# Patient Record
Sex: MALE | Race: WHITE | NOT HISPANIC OR LATINO | Employment: FULL TIME | URBAN - METROPOLITAN AREA
[De-identification: names, ages, dates, MRNs, and addresses within clinical notes are randomized per-mention and may not be internally consistent; named-entity substitution may affect disease eponyms.]

---

## 2022-01-06 ENCOUNTER — LAB VISIT (OUTPATIENT)
Dept: LAB | Facility: HOSPITAL | Age: 32
End: 2022-01-06
Attending: INTERNAL MEDICINE
Payer: COMMERCIAL

## 2022-01-06 ENCOUNTER — OFFICE VISIT (OUTPATIENT)
Dept: INTERNAL MEDICINE | Facility: CLINIC | Age: 32
End: 2022-01-06
Payer: COMMERCIAL

## 2022-01-06 VITALS
OXYGEN SATURATION: 96 % | DIASTOLIC BLOOD PRESSURE: 98 MMHG | HEIGHT: 75 IN | SYSTOLIC BLOOD PRESSURE: 132 MMHG | HEART RATE: 65 BPM | TEMPERATURE: 98 F | BODY MASS INDEX: 38.57 KG/M2 | WEIGHT: 310.19 LBS

## 2022-01-06 DIAGNOSIS — E66.9 CLASS 2 OBESITY WITH BODY MASS INDEX (BMI) OF 38.0 TO 38.9 IN ADULT, UNSPECIFIED OBESITY TYPE, UNSPECIFIED WHETHER SERIOUS COMORBIDITY PRESENT: ICD-10-CM

## 2022-01-06 DIAGNOSIS — R68.82 DECREASED LIBIDO: ICD-10-CM

## 2022-01-06 DIAGNOSIS — Z00.00 ROUTINE GENERAL MEDICAL EXAMINATION AT A HEALTH CARE FACILITY: ICD-10-CM

## 2022-01-06 DIAGNOSIS — Z00.00 ROUTINE GENERAL MEDICAL EXAMINATION AT A HEALTH CARE FACILITY: Primary | ICD-10-CM

## 2022-01-06 LAB
BASOPHILS # BLD AUTO: 0.08 K/UL (ref 0–0.2)
BASOPHILS NFR BLD: 0.9 % (ref 0–1.9)
CHOLEST SERPL-MCNC: 171 MG/DL (ref 120–199)
CHOLEST/HDLC SERPL: 4.8 {RATIO} (ref 2–5)
DIFFERENTIAL METHOD: ABNORMAL
EOSINOPHIL # BLD AUTO: 0.2 K/UL (ref 0–0.5)
EOSINOPHIL NFR BLD: 2.7 % (ref 0–8)
ERYTHROCYTE [DISTWIDTH] IN BLOOD BY AUTOMATED COUNT: 13.3 % (ref 11.5–14.5)
HCT VFR BLD AUTO: 44.1 % (ref 40–54)
HDLC SERPL-MCNC: 36 MG/DL (ref 40–75)
HDLC SERPL: 21.1 % (ref 20–50)
HGB BLD-MCNC: 14 G/DL (ref 14–18)
IMM GRANULOCYTES # BLD AUTO: 0.06 K/UL (ref 0–0.04)
IMM GRANULOCYTES NFR BLD AUTO: 0.7 % (ref 0–0.5)
LDLC SERPL CALC-MCNC: 101.8 MG/DL (ref 63–159)
LYMPHOCYTES # BLD AUTO: 2.3 K/UL (ref 1–4.8)
LYMPHOCYTES NFR BLD: 25.3 % (ref 18–48)
MCH RBC QN AUTO: 26.7 PG (ref 27–31)
MCHC RBC AUTO-ENTMCNC: 31.7 G/DL (ref 32–36)
MCV RBC AUTO: 84 FL (ref 82–98)
MONOCYTES # BLD AUTO: 0.7 K/UL (ref 0.3–1)
MONOCYTES NFR BLD: 7.6 % (ref 4–15)
NEUTROPHILS # BLD AUTO: 5.6 K/UL (ref 1.8–7.7)
NEUTROPHILS NFR BLD: 62.8 % (ref 38–73)
NONHDLC SERPL-MCNC: 135 MG/DL
NRBC BLD-RTO: 0 /100 WBC
PLATELET # BLD AUTO: 265 K/UL (ref 150–450)
PMV BLD AUTO: 12.6 FL (ref 9.2–12.9)
RBC # BLD AUTO: 5.25 M/UL (ref 4.6–6.2)
TRIGL SERPL-MCNC: 166 MG/DL (ref 30–150)
TSH SERPL DL<=0.005 MIU/L-ACNC: 1.24 UIU/ML (ref 0.4–4)
WBC # BLD AUTO: 8.9 K/UL (ref 3.9–12.7)

## 2022-01-06 PROCEDURE — 84443 ASSAY THYROID STIM HORMONE: CPT | Performed by: INTERNAL MEDICINE

## 2022-01-06 PROCEDURE — 3080F PR MOST RECENT DIASTOLIC BLOOD PRESSURE >= 90 MM HG: ICD-10-PCS | Mod: CPTII,S$GLB,, | Performed by: INTERNAL MEDICINE

## 2022-01-06 PROCEDURE — 87389 HIV-1 AG W/HIV-1&-2 AB AG IA: CPT | Performed by: INTERNAL MEDICINE

## 2022-01-06 PROCEDURE — 1159F PR MEDICATION LIST DOCUMENTED IN MEDICAL RECORD: ICD-10-PCS | Mod: CPTII,S$GLB,, | Performed by: INTERNAL MEDICINE

## 2022-01-06 PROCEDURE — 80061 LIPID PANEL: CPT | Performed by: INTERNAL MEDICINE

## 2022-01-06 PROCEDURE — 99385 PREV VISIT NEW AGE 18-39: CPT | Mod: S$GLB,,, | Performed by: INTERNAL MEDICINE

## 2022-01-06 PROCEDURE — 3080F DIAST BP >= 90 MM HG: CPT | Mod: CPTII,S$GLB,, | Performed by: INTERNAL MEDICINE

## 2022-01-06 PROCEDURE — 3075F PR MOST RECENT SYSTOLIC BLOOD PRESS GE 130-139MM HG: ICD-10-PCS | Mod: CPTII,S$GLB,, | Performed by: INTERNAL MEDICINE

## 2022-01-06 PROCEDURE — 82040 ASSAY OF SERUM ALBUMIN: CPT | Performed by: INTERNAL MEDICINE

## 2022-01-06 PROCEDURE — 3075F SYST BP GE 130 - 139MM HG: CPT | Mod: CPTII,S$GLB,, | Performed by: INTERNAL MEDICINE

## 2022-01-06 PROCEDURE — 3008F BODY MASS INDEX DOCD: CPT | Mod: CPTII,S$GLB,, | Performed by: INTERNAL MEDICINE

## 2022-01-06 PROCEDURE — 3008F PR BODY MASS INDEX (BMI) DOCUMENTED: ICD-10-PCS | Mod: CPTII,S$GLB,, | Performed by: INTERNAL MEDICINE

## 2022-01-06 PROCEDURE — 86803 HEPATITIS C AB TEST: CPT | Performed by: INTERNAL MEDICINE

## 2022-01-06 PROCEDURE — 99999 PR PBB SHADOW E&M-NEW PATIENT-LVL IV: ICD-10-PCS | Mod: PBBFAC,,, | Performed by: INTERNAL MEDICINE

## 2022-01-06 PROCEDURE — 85025 COMPLETE CBC W/AUTO DIFF WBC: CPT | Performed by: INTERNAL MEDICINE

## 2022-01-06 PROCEDURE — 99385 PR PREVENTIVE VISIT,NEW,18-39: ICD-10-PCS | Mod: S$GLB,,, | Performed by: INTERNAL MEDICINE

## 2022-01-06 PROCEDURE — 1159F MED LIST DOCD IN RCRD: CPT | Mod: CPTII,S$GLB,, | Performed by: INTERNAL MEDICINE

## 2022-01-06 PROCEDURE — 36415 COLL VENOUS BLD VENIPUNCTURE: CPT | Performed by: INTERNAL MEDICINE

## 2022-01-06 PROCEDURE — 99999 PR PBB SHADOW E&M-NEW PATIENT-LVL IV: CPT | Mod: PBBFAC,,, | Performed by: INTERNAL MEDICINE

## 2022-01-06 RX ORDER — ONDANSETRON 4 MG/1
TABLET, ORALLY DISINTEGRATING ORAL
COMMUNITY
Start: 2021-06-28 | End: 2022-01-06

## 2022-01-06 NOTE — PROGRESS NOTES
"Subjective:      Patient ID: Enrike Parish is a 31 y.o. male.    Chief Complaint: Establish Care    HPI     30 yo with There is no problem list on file for this patient.    Here today for annual prev exam.  Compliant with meds without significant side effects. Energy and appetite are good.     Feels decreased sex drive.     Vapes. No cigs    Declines flu vaccine and covid vaccine.     Social History     Socioeconomic History    Marital status:    Tobacco Use    Smoking status: Current Every Day Smoker     Types: Vaping with nicotine    Smokeless tobacco: Never Used   Substance and Sexual Activity    Alcohol use: Not Currently    Drug use: Not Currently    Sexual activity: Yes     Partners: Female       family history includes Diabetes in his mother; Multiple sclerosis in his mother; Stroke in his father.  No f/h of prostate or colon cancer.     Review of Systems   Constitutional: Negative for chills and fever.   HENT: Negative for ear pain and sore throat.    Respiratory: Negative for cough.    Cardiovascular: Negative for chest pain.   Gastrointestinal: Negative for abdominal pain and blood in stool.   Genitourinary: Negative for dysuria and hematuria.   Neurological: Negative for seizures and syncope.     Objective:   BP (!) 132/98 (BP Location: Right arm, Patient Position: Sitting, BP Method: Large (Manual))   Pulse 65   Temp 97.6 °F (36.4 °C) (Tympanic)   Ht 6' 3" (1.905 m)   Wt (!) 140.7 kg (310 lb 3 oz)   SpO2 96%   BMI 38.77 kg/m²     Physical Exam  Constitutional:       General: He is not in acute distress.     Appearance: He is well-developed and well-nourished.   HENT:      Head: Normocephalic and atraumatic.      Mouth/Throat:      Mouth: Oropharynx is clear and moist.   Eyes:      Extraocular Movements: EOM normal.      Pupils: Pupils are equal, round, and reactive to light.   Neck:      Thyroid: No thyromegaly.   Cardiovascular:      Rate and Rhythm: Normal rate and regular rhythm. "   Pulmonary:      Breath sounds: Normal breath sounds. No wheezing or rales.   Abdominal:      General: Bowel sounds are normal.      Palpations: Abdomen is soft.      Tenderness: There is no abdominal tenderness.   Musculoskeletal:      Cervical back: Neck supple.   Lymphadenopathy:      Cervical: No cervical adenopathy.   Skin:     General: Skin is warm and dry.   Neurological:      Mental Status: He is alert and oriented to person, place, and time.   Psychiatric:         Mood and Affect: Mood and affect normal.         Behavior: Behavior normal.         Assessment:     1. Routine general medical examination at a health care facility    2. Decreased libido    3. Class 2 obesity with body mass index (BMI) of 38.0 to 38.9 in adult, unspecified obesity type, unspecified whether serious comorbidity present      Plan:   Routine general medical examination at a health care facility  Heart healthy diet and reg exercise  HM reviewed  -     Lipid Panel; Future; Expected date: 01/06/2022  -     CBC Auto Differential; Future; Expected date: 01/06/2022  -     TSH; Future; Expected date: 01/06/2022  -     HIV 1/2 Ag/Ab (4th Gen); Future; Expected date: 01/06/2022  -     Hepatitis C Antibody; Future; Expected date: 01/06/2022    Decreased libido  -     TESTOSTERONE PANEL; Future; Expected date: 01/06/2022    Class 2 obesity with body mass index (BMI) of 38.0 to 38.9 in adult, unspecified obesity type, unspecified whether serious comorbidity present  D and e  -     Ambulatory referral/consult to Nutrition Services; Future; Expected date: 01/13/2022            Problem List Items Addressed This Visit    None     Visit Diagnoses     Routine general medical examination at a health care facility    -  Primary    Relevant Orders    Lipid Panel (Completed)    CBC Auto Differential (Completed)    TSH (Completed)    HIV 1/2 Ag/Ab (4th Gen) (Completed)    Hepatitis C Antibody (Completed)    Decreased libido        Relevant Orders     TESTOSTERONE PANEL    Class 2 obesity with body mass index (BMI) of 38.0 to 38.9 in adult, unspecified obesity type, unspecified whether serious comorbidity present        Relevant Orders    Ambulatory referral/consult to Nutrition Services          Follow up in about 6 months (around 7/6/2022), or if symptoms worsen or fail to improve.

## 2022-01-07 LAB
HCV AB SERPL QL IA: NEGATIVE
HIV 1+2 AB+HIV1 P24 AG SERPL QL IA: NEGATIVE

## 2022-01-10 ENCOUNTER — PATIENT MESSAGE (OUTPATIENT)
Dept: INTERNAL MEDICINE | Facility: CLINIC | Age: 32
End: 2022-01-10
Payer: COMMERCIAL

## 2022-01-12 LAB
ALBUMIN SERPL-MCNC: 4.3 G/DL (ref 3.6–5.1)
SHBG SERPL-SCNC: 10 NMOL/L (ref 10–50)
TESTOST FREE SERPL-MCNC: 69.2 PG/ML (ref 46–224)
TESTOST SERPL-MCNC: 248 NG/DL (ref 250–1100)
TESTOSTERONE.FREE+WB SERPL-MCNC: 136.3 NG/DL (ref 110–575)

## 2022-01-20 ENCOUNTER — TELEPHONE (OUTPATIENT)
Dept: INTERNAL MEDICINE | Facility: CLINIC | Age: 32
End: 2022-01-20
Payer: COMMERCIAL

## 2022-01-20 DIAGNOSIS — R79.89 LOW TESTOSTERONE: Primary | ICD-10-CM

## 2022-01-21 NOTE — TELEPHONE ENCOUNTER
Total testosterone level is low.  This could be just a function of obesity.  However have placed a referral to our urologist for further evaluation

## 2022-01-23 ENCOUNTER — PATIENT MESSAGE (OUTPATIENT)
Dept: INTERNAL MEDICINE | Facility: CLINIC | Age: 32
End: 2022-01-23
Payer: COMMERCIAL

## 2022-01-31 ENCOUNTER — OFFICE VISIT (OUTPATIENT)
Dept: UROLOGY | Facility: CLINIC | Age: 32
End: 2022-01-31
Payer: COMMERCIAL

## 2022-01-31 VITALS
SYSTOLIC BLOOD PRESSURE: 132 MMHG | HEART RATE: 109 BPM | WEIGHT: 310.19 LBS | HEIGHT: 75 IN | DIASTOLIC BLOOD PRESSURE: 89 MMHG | BODY MASS INDEX: 38.57 KG/M2 | TEMPERATURE: 98 F

## 2022-01-31 DIAGNOSIS — R79.89 LOW TESTOSTERONE: ICD-10-CM

## 2022-01-31 PROCEDURE — 3075F SYST BP GE 130 - 139MM HG: CPT | Mod: CPTII,S$GLB,, | Performed by: NURSE PRACTITIONER

## 2022-01-31 PROCEDURE — 99999 PR PBB SHADOW E&M-EST. PATIENT-LVL III: CPT | Mod: PBBFAC,,, | Performed by: NURSE PRACTITIONER

## 2022-01-31 PROCEDURE — 99999 PR PBB SHADOW E&M-EST. PATIENT-LVL III: ICD-10-PCS | Mod: PBBFAC,,, | Performed by: NURSE PRACTITIONER

## 2022-01-31 PROCEDURE — 3079F PR MOST RECENT DIASTOLIC BLOOD PRESSURE 80-89 MM HG: ICD-10-PCS | Mod: CPTII,S$GLB,, | Performed by: NURSE PRACTITIONER

## 2022-01-31 PROCEDURE — 3008F PR BODY MASS INDEX (BMI) DOCUMENTED: ICD-10-PCS | Mod: CPTII,S$GLB,, | Performed by: NURSE PRACTITIONER

## 2022-01-31 PROCEDURE — 3075F PR MOST RECENT SYSTOLIC BLOOD PRESS GE 130-139MM HG: ICD-10-PCS | Mod: CPTII,S$GLB,, | Performed by: NURSE PRACTITIONER

## 2022-01-31 PROCEDURE — 99204 PR OFFICE/OUTPT VISIT, NEW, LEVL IV, 45-59 MIN: ICD-10-PCS | Mod: S$GLB,,, | Performed by: NURSE PRACTITIONER

## 2022-01-31 PROCEDURE — 3079F DIAST BP 80-89 MM HG: CPT | Mod: CPTII,S$GLB,, | Performed by: NURSE PRACTITIONER

## 2022-01-31 PROCEDURE — 3008F BODY MASS INDEX DOCD: CPT | Mod: CPTII,S$GLB,, | Performed by: NURSE PRACTITIONER

## 2022-01-31 PROCEDURE — 1159F MED LIST DOCD IN RCRD: CPT | Mod: CPTII,S$GLB,, | Performed by: NURSE PRACTITIONER

## 2022-01-31 PROCEDURE — 1159F PR MEDICATION LIST DOCUMENTED IN MEDICAL RECORD: ICD-10-PCS | Mod: CPTII,S$GLB,, | Performed by: NURSE PRACTITIONER

## 2022-01-31 PROCEDURE — 99204 OFFICE O/P NEW MOD 45 MIN: CPT | Mod: S$GLB,,, | Performed by: NURSE PRACTITIONER

## 2022-11-21 ENCOUNTER — OFFICE VISIT (OUTPATIENT)
Dept: INTERNAL MEDICINE | Facility: CLINIC | Age: 32
End: 2022-11-21
Payer: COMMERCIAL

## 2022-11-21 ENCOUNTER — LAB VISIT (OUTPATIENT)
Dept: LAB | Facility: HOSPITAL | Age: 32
End: 2022-11-21
Attending: NURSE PRACTITIONER
Payer: COMMERCIAL

## 2022-11-21 VITALS
HEIGHT: 75 IN | HEART RATE: 82 BPM | TEMPERATURE: 97 F | DIASTOLIC BLOOD PRESSURE: 100 MMHG | RESPIRATION RATE: 16 BRPM | BODY MASS INDEX: 39.17 KG/M2 | SYSTOLIC BLOOD PRESSURE: 138 MMHG | OXYGEN SATURATION: 97 % | WEIGHT: 315 LBS

## 2022-11-21 DIAGNOSIS — I10 PRIMARY HYPERTENSION: ICD-10-CM

## 2022-11-21 DIAGNOSIS — I10 HYPERTENSION, UNSPECIFIED TYPE: ICD-10-CM

## 2022-11-21 DIAGNOSIS — E66.01 SEVERE OBESITY (BMI 35.0-39.9) WITH COMORBIDITY: Primary | ICD-10-CM

## 2022-11-21 DIAGNOSIS — G47.30 SLEEP APNEA, UNSPECIFIED TYPE: ICD-10-CM

## 2022-11-21 LAB
ALBUMIN SERPL BCP-MCNC: 4.2 G/DL (ref 3.5–5.2)
ALP SERPL-CCNC: 61 U/L (ref 55–135)
ALT SERPL W/O P-5'-P-CCNC: 80 U/L (ref 10–44)
ANION GAP SERPL CALC-SCNC: 10 MMOL/L (ref 8–16)
AST SERPL-CCNC: 41 U/L (ref 10–40)
BASOPHILS # BLD AUTO: 0.07 K/UL (ref 0–0.2)
BASOPHILS NFR BLD: 0.9 % (ref 0–1.9)
BILIRUB SERPL-MCNC: 0.4 MG/DL (ref 0.1–1)
BUN SERPL-MCNC: 14 MG/DL (ref 6–20)
CALCIUM SERPL-MCNC: 10 MG/DL (ref 8.7–10.5)
CHLORIDE SERPL-SCNC: 105 MMOL/L (ref 95–110)
CHOLEST SERPL-MCNC: 201 MG/DL (ref 120–199)
CHOLEST/HDLC SERPL: 4.6 {RATIO} (ref 2–5)
CO2 SERPL-SCNC: 25 MMOL/L (ref 23–29)
CREAT SERPL-MCNC: 0.9 MG/DL (ref 0.5–1.4)
DIFFERENTIAL METHOD: ABNORMAL
EOSINOPHIL # BLD AUTO: 0.2 K/UL (ref 0–0.5)
EOSINOPHIL NFR BLD: 2.6 % (ref 0–8)
ERYTHROCYTE [DISTWIDTH] IN BLOOD BY AUTOMATED COUNT: 13.5 % (ref 11.5–14.5)
EST. GFR  (NO RACE VARIABLE): >60 ML/MIN/1.73 M^2
ESTIMATED AVG GLUCOSE: 108 MG/DL (ref 68–131)
GLUCOSE SERPL-MCNC: 96 MG/DL (ref 70–110)
HBA1C MFR BLD: 5.4 % (ref 4–5.6)
HCT VFR BLD AUTO: 44.9 % (ref 40–54)
HDLC SERPL-MCNC: 44 MG/DL (ref 40–75)
HDLC SERPL: 21.9 % (ref 20–50)
HGB BLD-MCNC: 14 G/DL (ref 14–18)
IMM GRANULOCYTES # BLD AUTO: 0.06 K/UL (ref 0–0.04)
IMM GRANULOCYTES NFR BLD AUTO: 0.8 % (ref 0–0.5)
LDLC SERPL CALC-MCNC: 105.2 MG/DL (ref 63–159)
LYMPHOCYTES # BLD AUTO: 2 K/UL (ref 1–4.8)
LYMPHOCYTES NFR BLD: 25.7 % (ref 18–48)
MCH RBC QN AUTO: 26.8 PG (ref 27–31)
MCHC RBC AUTO-ENTMCNC: 31.2 G/DL (ref 32–36)
MCV RBC AUTO: 86 FL (ref 82–98)
MONOCYTES # BLD AUTO: 0.6 K/UL (ref 0.3–1)
MONOCYTES NFR BLD: 7.5 % (ref 4–15)
NEUTROPHILS # BLD AUTO: 4.9 K/UL (ref 1.8–7.7)
NEUTROPHILS NFR BLD: 62.5 % (ref 38–73)
NONHDLC SERPL-MCNC: 157 MG/DL
NRBC BLD-RTO: 0 /100 WBC
PLATELET # BLD AUTO: 257 K/UL (ref 150–450)
PMV BLD AUTO: 11.8 FL (ref 9.2–12.9)
POTASSIUM SERPL-SCNC: 4.7 MMOL/L (ref 3.5–5.1)
PROT SERPL-MCNC: 7.6 G/DL (ref 6–8.4)
RBC # BLD AUTO: 5.23 M/UL (ref 4.6–6.2)
SODIUM SERPL-SCNC: 140 MMOL/L (ref 136–145)
TRIGL SERPL-MCNC: 259 MG/DL (ref 30–150)
TSH SERPL DL<=0.005 MIU/L-ACNC: 1.47 UIU/ML (ref 0.4–4)
WBC # BLD AUTO: 7.82 K/UL (ref 3.9–12.7)

## 2022-11-21 PROCEDURE — 1159F MED LIST DOCD IN RCRD: CPT | Mod: CPTII,S$GLB,, | Performed by: NURSE PRACTITIONER

## 2022-11-21 PROCEDURE — 1160F PR REVIEW ALL MEDS BY PRESCRIBER/CLIN PHARMACIST DOCUMENTED: ICD-10-PCS | Mod: CPTII,S$GLB,, | Performed by: NURSE PRACTITIONER

## 2022-11-21 PROCEDURE — 99999 PR PBB SHADOW E&M-EST. PATIENT-LVL V: ICD-10-PCS | Mod: PBBFAC,,, | Performed by: NURSE PRACTITIONER

## 2022-11-21 PROCEDURE — 3075F SYST BP GE 130 - 139MM HG: CPT | Mod: CPTII,S$GLB,, | Performed by: NURSE PRACTITIONER

## 2022-11-21 PROCEDURE — 36415 COLL VENOUS BLD VENIPUNCTURE: CPT | Performed by: NURSE PRACTITIONER

## 2022-11-21 PROCEDURE — 99213 OFFICE O/P EST LOW 20 MIN: CPT | Mod: S$GLB,,, | Performed by: NURSE PRACTITIONER

## 2022-11-21 PROCEDURE — 84443 ASSAY THYROID STIM HORMONE: CPT | Performed by: NURSE PRACTITIONER

## 2022-11-21 PROCEDURE — 99213 PR OFFICE/OUTPT VISIT, EST, LEVL III, 20-29 MIN: ICD-10-PCS | Mod: S$GLB,,, | Performed by: NURSE PRACTITIONER

## 2022-11-21 PROCEDURE — 1160F RVW MEDS BY RX/DR IN RCRD: CPT | Mod: CPTII,S$GLB,, | Performed by: NURSE PRACTITIONER

## 2022-11-21 PROCEDURE — 1159F PR MEDICATION LIST DOCUMENTED IN MEDICAL RECORD: ICD-10-PCS | Mod: CPTII,S$GLB,, | Performed by: NURSE PRACTITIONER

## 2022-11-21 PROCEDURE — 80053 COMPREHEN METABOLIC PANEL: CPT | Performed by: NURSE PRACTITIONER

## 2022-11-21 PROCEDURE — 3080F PR MOST RECENT DIASTOLIC BLOOD PRESSURE >= 90 MM HG: ICD-10-PCS | Mod: CPTII,S$GLB,, | Performed by: NURSE PRACTITIONER

## 2022-11-21 PROCEDURE — 3008F BODY MASS INDEX DOCD: CPT | Mod: CPTII,S$GLB,, | Performed by: NURSE PRACTITIONER

## 2022-11-21 PROCEDURE — 85025 COMPLETE CBC W/AUTO DIFF WBC: CPT | Performed by: NURSE PRACTITIONER

## 2022-11-21 PROCEDURE — 3075F PR MOST RECENT SYSTOLIC BLOOD PRESS GE 130-139MM HG: ICD-10-PCS | Mod: CPTII,S$GLB,, | Performed by: NURSE PRACTITIONER

## 2022-11-21 PROCEDURE — 99999 PR PBB SHADOW E&M-EST. PATIENT-LVL V: CPT | Mod: PBBFAC,,, | Performed by: NURSE PRACTITIONER

## 2022-11-21 PROCEDURE — 3008F PR BODY MASS INDEX (BMI) DOCUMENTED: ICD-10-PCS | Mod: CPTII,S$GLB,, | Performed by: NURSE PRACTITIONER

## 2022-11-21 PROCEDURE — 80061 LIPID PANEL: CPT | Performed by: NURSE PRACTITIONER

## 2022-11-21 PROCEDURE — 3044F HG A1C LEVEL LT 7.0%: CPT | Mod: CPTII,S$GLB,, | Performed by: NURSE PRACTITIONER

## 2022-11-21 PROCEDURE — 3044F PR MOST RECENT HEMOGLOBIN A1C LEVEL <7.0%: ICD-10-PCS | Mod: CPTII,S$GLB,, | Performed by: NURSE PRACTITIONER

## 2022-11-21 PROCEDURE — 3080F DIAST BP >= 90 MM HG: CPT | Mod: CPTII,S$GLB,, | Performed by: NURSE PRACTITIONER

## 2022-11-21 PROCEDURE — 4010F PR ACE/ARB THEARPY RXD/TAKEN: ICD-10-PCS | Mod: CPTII,S$GLB,, | Performed by: NURSE PRACTITIONER

## 2022-11-21 PROCEDURE — 4010F ACE/ARB THERAPY RXD/TAKEN: CPT | Mod: CPTII,S$GLB,, | Performed by: NURSE PRACTITIONER

## 2022-11-21 PROCEDURE — 83036 HEMOGLOBIN GLYCOSYLATED A1C: CPT | Performed by: NURSE PRACTITIONER

## 2022-11-21 RX ORDER — LISINOPRIL 20 MG/1
20 TABLET ORAL DAILY
Qty: 30 TABLET | Refills: 0 | Status: SHIPPED | OUTPATIENT
Start: 2022-11-21 | End: 2022-12-08 | Stop reason: SDUPTHER

## 2022-11-22 ENCOUNTER — PATIENT MESSAGE (OUTPATIENT)
Dept: PULMONOLOGY | Facility: CLINIC | Age: 32
End: 2022-11-22
Payer: COMMERCIAL

## 2022-11-23 RX ORDER — ATORVASTATIN CALCIUM 20 MG/1
20 TABLET, FILM COATED ORAL DAILY
Qty: 30 TABLET | Refills: 0 | Status: SHIPPED | OUTPATIENT
Start: 2022-11-23 | End: 2022-12-08

## 2022-11-25 NOTE — PROGRESS NOTES
Enrike HOFFMAN [place]  11/25/2022  [id number]    Nolan Vaughn MD  Patient Care Team:  Nolan Vaughn MD as PCP - General (Internal Medicine)          Visit Type:an urgent visit for an existing problem     Chief Complaint:  Chief Complaint   Patient presents with    Hypertension       History of Present Illness:    32 year old male presents with complaint of obesity and  hypertension.  Reports he works out of town and it is hard to eat healthy and exercise regularly.     History:  History reviewed. No pertinent past medical history.  Past Surgical History:   Procedure Laterality Date    KNEE SURGERY Bilateral      Family History   Problem Relation Age of Onset    Multiple sclerosis Mother     Diabetes Mother     Stroke Father      Social History     Socioeconomic History    Marital status:    Tobacco Use    Smoking status: Every Day     Types: Vaping with nicotine    Smokeless tobacco: Never   Substance and Sexual Activity    Alcohol use: Not Currently    Drug use: Not Currently    Sexual activity: Yes     Partners: Female     Patient Active Problem List   Diagnosis    Severe obesity (BMI 35.0-39.9) with comorbidity     Review of patient's allergies indicates:  No Known Allergies    The following were reviewed at this visit: active problem list, medication list, allergies, family history, social history, and health maintenance.    Medications:  No current outpatient medications on file prior to visit.     No current facility-administered medications on file prior to visit.       Medications have been reviewed and reconciled with patient at this visit.  Barriers to medications reviewed with patient.    Adverse reactions to current medications reviewed with patient..    Over the counter medications reviewed and reconciled with patient.    Exam:  Wt Readings from Last 3 Encounters:   11/21/22 (!) 144.7 kg (319 lb 0.1 oz)   01/31/22 (!) 140.7 kg (310 lb 3 oz)   01/06/22 (!) 140.7 kg (310 lb 3 oz)     Temp Readings  from Last 3 Encounters:   11/21/22 96.7 °F (35.9 °C) (Tympanic)   01/31/22 98.4 °F (36.9 °C)   01/06/22 97.6 °F (36.4 °C) (Tympanic)     BP Readings from Last 3 Encounters:   11/21/22 (!) 138/100   01/31/22 132/89   01/06/22 (!) 132/98     Pulse Readings from Last 3 Encounters:   11/21/22 82   01/31/22 109   01/06/22 65     Body mass index is 39.87 kg/m².      Review of Systems   Constitutional: Negative.    HENT: Negative.     Eyes: Negative.    Respiratory: Negative.     Cardiovascular: Negative.    Gastrointestinal: Negative.    Genitourinary: Negative.    Musculoskeletal: Negative.    Skin: Negative.    Neurological: Negative.    Endo/Heme/Allergies: Negative.    Physical Exam  Vitals and nursing note reviewed.   Constitutional:       Appearance: Normal appearance. He is obese.   HENT:      Head: Normocephalic and atraumatic.      Right Ear: Tympanic membrane, ear canal and external ear normal.      Left Ear: Tympanic membrane, ear canal and external ear normal.      Nose: Nose normal.      Mouth/Throat:      Mouth: Mucous membranes are moist.      Pharynx: Oropharynx is clear.   Eyes:      Extraocular Movements: Extraocular movements intact.      Conjunctiva/sclera: Conjunctivae normal.      Pupils: Pupils are equal, round, and reactive to light.   Cardiovascular:      Rate and Rhythm: Normal rate and regular rhythm.      Pulses: Normal pulses.      Heart sounds: Normal heart sounds.   Pulmonary:      Effort: Pulmonary effort is normal.      Breath sounds: Normal breath sounds.   Abdominal:      General: Bowel sounds are normal.      Palpations: Abdomen is soft.   Musculoskeletal:         General: Normal range of motion.      Cervical back: Normal range of motion and neck supple.   Skin:     General: Skin is warm and dry.      Capillary Refill: Capillary refill takes less than 2 seconds.   Neurological:      General: No focal deficit present.      Mental Status: He is alert and oriented to person, place, and  time.   Psychiatric:         Mood and Affect: Mood normal.         Behavior: Behavior normal.         Thought Content: Thought content normal.         Judgment: Judgment normal.       Laboratory Reviewed ({Yes)  Lab Results   Component Value Date    WBC 7.82 11/21/2022    HGB 14.0 11/21/2022    HCT 44.9 11/21/2022     11/21/2022    CHOL 201 (H) 11/21/2022    TRIG 259 (H) 11/21/2022    HDL 44 11/21/2022    ALT 80 (H) 11/21/2022    AST 41 (H) 11/21/2022     11/21/2022    K 4.7 11/21/2022     11/21/2022    CREATININE 0.9 11/21/2022    BUN 14 11/21/2022    CO2 25 11/21/2022    TSH 1.472 11/21/2022    HGBA1C 5.4 11/21/2022       Enrike was seen today for hypertension.    Diagnoses and all orders for this visit:    Severe obesity (BMI 35.0-39.9) with comorbidity    Hypertension, unspecified type  -     TSH; Future  -     LIPID PANEL; Future  -     CBC W/ AUTO DIFFERENTIAL; Future  -     COMPREHENSIVE METABOLIC PANEL; Future  -     HEMOGLOBIN A1C; Future  -     lisinopriL (PRINIVIL,ZESTRIL) 20 MG tablet; Take 1 tablet (20 mg total) by mouth once daily.    Sleep apnea, unspecified type  -     Ambulatory referral/consult to Sleep Disorders; Future        Care Plan/Goals: Reviewed    Goals    None       Enrike was seen today for hypertension.    Diagnoses and all orders for this visit:    Severe obesity (BMI 35.0-39.9) with comorbidity    Hypertension, unspecified type  -     TSH; Future  -     LIPID PANEL; Future  -     CBC W/ AUTO DIFFERENTIAL; Future  -     COMPREHENSIVE METABOLIC PANEL; Future  -     HEMOGLOBIN A1C; Future  -     Discontinue: lisinopriL (PRINIVIL,ZESTRIL) 20 MG tablet; Take 1 tablet (20 mg total) by mouth once daily.    Sleep apnea, unspecified type  -     Ambulatory referral/consult to Sleep Disorders; Future    Primary hypertension       Follow up: No follow-ups on file.    After visit summary was printed and given to patient upon discharge today.  Patient goals and care plan are  included in After Visit Summary.

## 2022-12-08 ENCOUNTER — OFFICE VISIT (OUTPATIENT)
Dept: INTERNAL MEDICINE | Facility: CLINIC | Age: 32
End: 2022-12-08
Payer: COMMERCIAL

## 2022-12-08 DIAGNOSIS — E78.5 HYPERLIPIDEMIA, UNSPECIFIED HYPERLIPIDEMIA TYPE: Primary | ICD-10-CM

## 2022-12-08 DIAGNOSIS — I10 HYPERTENSION, UNSPECIFIED TYPE: ICD-10-CM

## 2022-12-08 DIAGNOSIS — R74.8 ELEVATED LIVER ENZYMES: ICD-10-CM

## 2022-12-08 DIAGNOSIS — E66.01 MORBID OBESITY: ICD-10-CM

## 2022-12-08 PROCEDURE — 3044F PR MOST RECENT HEMOGLOBIN A1C LEVEL <7.0%: ICD-10-PCS | Mod: CPTII,95,, | Performed by: NURSE PRACTITIONER

## 2022-12-08 PROCEDURE — 4010F PR ACE/ARB THEARPY RXD/TAKEN: ICD-10-PCS | Mod: CPTII,95,, | Performed by: NURSE PRACTITIONER

## 2022-12-08 PROCEDURE — 4010F ACE/ARB THERAPY RXD/TAKEN: CPT | Mod: CPTII,95,, | Performed by: NURSE PRACTITIONER

## 2022-12-08 PROCEDURE — 99213 OFFICE O/P EST LOW 20 MIN: CPT | Mod: 95,,, | Performed by: NURSE PRACTITIONER

## 2022-12-08 PROCEDURE — 99213 PR OFFICE/OUTPT VISIT, EST, LEVL III, 20-29 MIN: ICD-10-PCS | Mod: 95,,, | Performed by: NURSE PRACTITIONER

## 2022-12-08 PROCEDURE — 3044F HG A1C LEVEL LT 7.0%: CPT | Mod: CPTII,95,, | Performed by: NURSE PRACTITIONER

## 2022-12-08 RX ORDER — ATORVASTATIN CALCIUM 20 MG/1
20 TABLET, FILM COATED ORAL DAILY
Qty: 30 TABLET | Refills: 10 | Status: SHIPPED | OUTPATIENT
Start: 2022-12-08 | End: 2024-03-28 | Stop reason: SDUPTHER

## 2022-12-08 RX ORDER — LISINOPRIL 20 MG/1
20 TABLET ORAL DAILY
Qty: 30 TABLET | Refills: 10 | Status: SHIPPED | OUTPATIENT
Start: 2022-12-08 | End: 2024-03-28

## 2022-12-08 NOTE — PROGRESS NOTES
Enrike Parish  12/08/2022  [id number]    Vandana Henderson MD  Patient Care Team:  Vandana Henderson MD as PCP - General (Internal Medicine)          Visit Type:an urgent visit for an existing problem     Chief Complaint:  No chief complaint on file.      History of Present Illness:      Enrike Parish  32 y.o. White male    follow up on hypertension via telemedicine. Pt reports taking 1/2 bp pill and systolic BP in the 160s. Pt reports he has not started his lipitor.      Reports taking medications as instructed.  Not taking any OTC meds.      Current Outpatient Medications:     atorvastatin (LIPITOR) 20 MG tablet, Take 1 tablet (20 mg total) by mouth once daily., Disp: 30 tablet, Rfl: 10    lisinopriL (PRINIVIL,ZESTRIL) 20 MG tablet, Take 1 tablet (20 mg total) by mouth once daily., Disp: 30 tablet, Rfl: 10    Review of patient's allergies indicates:  No Known Allergies    ROS: Denies dizziness, headache, chest pain, shortness of breath or edema    PE:  GEN: Alert and oriented, no acute distress  HEART: no lower extremity edema  LUNGS: Respirations unlabored    ASSESSMENT/PLAN:  1. Hypertension  2. Hyperlipidemia   3. Morbid obesity      History:  No past medical history on file.  Past Surgical History:   Procedure Laterality Date    KNEE SURGERY Bilateral      Family History   Problem Relation Age of Onset    Multiple sclerosis Mother     Diabetes Mother     Stroke Father      Social History     Socioeconomic History    Marital status:    Tobacco Use    Smoking status: Every Day     Types: Vaping with nicotine    Smokeless tobacco: Never   Substance and Sexual Activity    Alcohol use: Not Currently    Drug use: Not Currently    Sexual activity: Yes     Partners: Female     Patient Active Problem List   Diagnosis    Severe obesity (BMI 35.0-39.9) with comorbidity     Review of patient's allergies indicates:  No Known Allergies    The following were reviewed at this visit: active problem list, medication  list, allergies, family history, social history, and health maintenance.    Medications:  Current Outpatient Medications on File Prior to Visit   Medication Sig Dispense Refill    [DISCONTINUED] atorvastatin (LIPITOR) 20 MG tablet Take 1 tablet (20 mg total) by mouth once daily. 30 tablet 0    [DISCONTINUED] lisinopriL (PRINIVIL,ZESTRIL) 20 MG tablet Take 1 tablet (20 mg total) by mouth once daily. 30 tablet 0     No current facility-administered medications on file prior to visit.       Medications have been reviewed and reconciled with patient at this visit.  Barriers to medications reviewed with patient.    Adverse reactions to current medications reviewed with patient..    Over the counter medications reviewed and reconciled with patient.    Exam:  Wt Readings from Last 3 Encounters:   11/21/22 (!) 144.7 kg (319 lb 0.1 oz)   01/31/22 (!) 140.7 kg (310 lb 3 oz)   01/06/22 (!) 140.7 kg (310 lb 3 oz)     Temp Readings from Last 3 Encounters:   11/21/22 96.7 °F (35.9 °C) (Tympanic)   01/31/22 98.4 °F (36.9 °C)   01/06/22 97.6 °F (36.4 °C) (Tympanic)     BP Readings from Last 3 Encounters:   11/21/22 (!) 138/100   01/31/22 132/89   01/06/22 (!) 132/98     Pulse Readings from Last 3 Encounters:   11/21/22 82   01/31/22 109   01/06/22 65     There is no height or weight on file to calculate BMI.      Review of Systems   Constitutional: Negative.    HENT: Negative.     Eyes: Negative.    Respiratory: Negative.     Cardiovascular: Negative.    Gastrointestinal: Negative.    Genitourinary: Negative.    Musculoskeletal: Negative.    Skin: Negative.    Neurological: Negative.    Endo/Heme/Allergies: Negative.  Does not bruise/bleed easily.   Psychiatric/Behavioral: Negative.     Physical Exam  Vitals reviewed.   Constitutional:       Appearance: Normal appearance. He is obese.   HENT:      Head: Normocephalic and atraumatic.      Nose: Nose normal.   Eyes:      Extraocular Movements: Extraocular movements intact.       Conjunctiva/sclera: Conjunctivae normal.      Pupils: Pupils are equal, round, and reactive to light.   Pulmonary:      Effort: Pulmonary effort is normal.   Neurological:      General: No focal deficit present.      Mental Status: He is alert and oriented to person, place, and time.   Psychiatric:         Mood and Affect: Mood normal.         Behavior: Behavior normal.         Thought Content: Thought content normal.         Judgment: Judgment normal.       Laboratory Reviewed ({Yes)  Lab Results   Component Value Date    WBC 7.82 11/21/2022    HGB 14.0 11/21/2022    HCT 44.9 11/21/2022     11/21/2022    CHOL 201 (H) 11/21/2022    TRIG 259 (H) 11/21/2022    HDL 44 11/21/2022    ALT 80 (H) 11/21/2022    AST 41 (H) 11/21/2022     11/21/2022    K 4.7 11/21/2022     11/21/2022    CREATININE 0.9 11/21/2022    BUN 14 11/21/2022    CO2 25 11/21/2022    TSH 1.472 11/21/2022    HGBA1C 5.4 11/21/2022               Care Plan/Goals: Reviewed   Hyperlipidemia, unspecified hyperlipidemia type  -     atorvastatin (LIPITOR) 20 MG tablet; Take 1 tablet (20 mg total) by mouth once daily.   Hypertension, unspecified type  -     lisinopriL (PRINIVIL,ZESTRIL) 20 MG tablet; Take 1 tablet (20 mg total) by mouth once daily.    Elevated liver enzymes    Morbid obesity  General weight loss/lifestyle modification strategies discussed (elicit support from others; identify saboteurs; non-food rewards  Diet interventions: diet diary for the following month, low calorie (1500 kCal/d) deficit diet, and qualitative changes (increase low-fat,  high-fiber foods).  Informal exercise measures discussed, e.g. taking stairs instead of elevator walking in the neighborhood 45 mins 4-5 days a week  Regular aerobic exercise program discussed.  Follow up and re-weigh in: 3 weeks and as needed.     Follow up: No follow-ups on file.    After visit summary was printed and given to patient upon discharge today.  Patient goals and care plan are  included in After Visit Summary.

## 2022-12-28 PROBLEM — I10 PRIMARY HYPERTENSION: Status: ACTIVE | Noted: 2022-12-28

## 2022-12-29 ENCOUNTER — OFFICE VISIT (OUTPATIENT)
Dept: INTERNAL MEDICINE | Facility: CLINIC | Age: 32
End: 2022-12-29
Payer: COMMERCIAL

## 2022-12-29 ENCOUNTER — LAB VISIT (OUTPATIENT)
Dept: LAB | Facility: HOSPITAL | Age: 32
End: 2022-12-29
Attending: NURSE PRACTITIONER
Payer: COMMERCIAL

## 2022-12-29 VITALS
OXYGEN SATURATION: 99 % | BODY MASS INDEX: 38.37 KG/M2 | DIASTOLIC BLOOD PRESSURE: 76 MMHG | WEIGHT: 308.63 LBS | HEIGHT: 75 IN | RESPIRATION RATE: 18 BRPM | TEMPERATURE: 98 F | SYSTOLIC BLOOD PRESSURE: 118 MMHG | HEART RATE: 97 BPM

## 2022-12-29 DIAGNOSIS — I10 HYPERTENSION, UNSPECIFIED TYPE: ICD-10-CM

## 2022-12-29 DIAGNOSIS — E78.5 HYPERLIPIDEMIA, UNSPECIFIED HYPERLIPIDEMIA TYPE: ICD-10-CM

## 2022-12-29 DIAGNOSIS — Z09 FOLLOW-UP EXAM: Primary | ICD-10-CM

## 2022-12-29 DIAGNOSIS — R63.4 WEIGHT LOSS: ICD-10-CM

## 2022-12-29 LAB
CHOLEST SERPL-MCNC: 135 MG/DL (ref 120–199)
CHOLEST/HDLC SERPL: 3.6 {RATIO} (ref 2–5)
HDLC SERPL-MCNC: 37 MG/DL (ref 40–75)
HDLC SERPL: 27.4 % (ref 20–50)
LDLC SERPL CALC-MCNC: 58.2 MG/DL (ref 63–159)
NONHDLC SERPL-MCNC: 98 MG/DL
TRIGL SERPL-MCNC: 199 MG/DL (ref 30–150)

## 2022-12-29 PROCEDURE — 1160F RVW MEDS BY RX/DR IN RCRD: CPT | Mod: CPTII,S$GLB,, | Performed by: NURSE PRACTITIONER

## 2022-12-29 PROCEDURE — 1159F PR MEDICATION LIST DOCUMENTED IN MEDICAL RECORD: ICD-10-PCS | Mod: CPTII,S$GLB,, | Performed by: NURSE PRACTITIONER

## 2022-12-29 PROCEDURE — 3078F DIAST BP <80 MM HG: CPT | Mod: CPTII,S$GLB,, | Performed by: NURSE PRACTITIONER

## 2022-12-29 PROCEDURE — 36415 COLL VENOUS BLD VENIPUNCTURE: CPT | Performed by: NURSE PRACTITIONER

## 2022-12-29 PROCEDURE — 99213 PR OFFICE/OUTPT VISIT, EST, LEVL III, 20-29 MIN: ICD-10-PCS | Mod: S$GLB,,, | Performed by: NURSE PRACTITIONER

## 2022-12-29 PROCEDURE — 1160F PR REVIEW ALL MEDS BY PRESCRIBER/CLIN PHARMACIST DOCUMENTED: ICD-10-PCS | Mod: CPTII,S$GLB,, | Performed by: NURSE PRACTITIONER

## 2022-12-29 PROCEDURE — 3078F PR MOST RECENT DIASTOLIC BLOOD PRESSURE < 80 MM HG: ICD-10-PCS | Mod: CPTII,S$GLB,, | Performed by: NURSE PRACTITIONER

## 2022-12-29 PROCEDURE — 1159F MED LIST DOCD IN RCRD: CPT | Mod: CPTII,S$GLB,, | Performed by: NURSE PRACTITIONER

## 2022-12-29 PROCEDURE — 3008F BODY MASS INDEX DOCD: CPT | Mod: CPTII,S$GLB,, | Performed by: NURSE PRACTITIONER

## 2022-12-29 PROCEDURE — 3044F HG A1C LEVEL LT 7.0%: CPT | Mod: CPTII,S$GLB,, | Performed by: NURSE PRACTITIONER

## 2022-12-29 PROCEDURE — 3008F PR BODY MASS INDEX (BMI) DOCUMENTED: ICD-10-PCS | Mod: CPTII,S$GLB,, | Performed by: NURSE PRACTITIONER

## 2022-12-29 PROCEDURE — 99213 OFFICE O/P EST LOW 20 MIN: CPT | Mod: S$GLB,,, | Performed by: NURSE PRACTITIONER

## 2022-12-29 PROCEDURE — 3074F SYST BP LT 130 MM HG: CPT | Mod: CPTII,S$GLB,, | Performed by: NURSE PRACTITIONER

## 2022-12-29 PROCEDURE — 99999 PR PBB SHADOW E&M-EST. PATIENT-LVL IV: ICD-10-PCS | Mod: PBBFAC,,, | Performed by: NURSE PRACTITIONER

## 2022-12-29 PROCEDURE — 80061 LIPID PANEL: CPT | Performed by: NURSE PRACTITIONER

## 2022-12-29 PROCEDURE — 4010F ACE/ARB THERAPY RXD/TAKEN: CPT | Mod: CPTII,S$GLB,, | Performed by: NURSE PRACTITIONER

## 2022-12-29 PROCEDURE — 3074F PR MOST RECENT SYSTOLIC BLOOD PRESSURE < 130 MM HG: ICD-10-PCS | Mod: CPTII,S$GLB,, | Performed by: NURSE PRACTITIONER

## 2022-12-29 PROCEDURE — 3044F PR MOST RECENT HEMOGLOBIN A1C LEVEL <7.0%: ICD-10-PCS | Mod: CPTII,S$GLB,, | Performed by: NURSE PRACTITIONER

## 2022-12-29 PROCEDURE — 4010F PR ACE/ARB THEARPY RXD/TAKEN: ICD-10-PCS | Mod: CPTII,S$GLB,, | Performed by: NURSE PRACTITIONER

## 2022-12-29 PROCEDURE — 99999 PR PBB SHADOW E&M-EST. PATIENT-LVL IV: CPT | Mod: PBBFAC,,, | Performed by: NURSE PRACTITIONER

## 2022-12-29 NOTE — PROGRESS NOTES
Enrike Tejedaiot  12/29/2022  [id number]    Vandana Henderson MD  Patient Care Team:  Vandana Henderson MD as PCP - General (Internal Medicine)          Visit Type:a scheduled routine follow-up visit    Chief Complaint:  Chief Complaint   Patient presents with    Follow-up    Hypertension       History of Present Illness:   32 year old male presents for follow up of weight loss, hyperlipidemia and blood pressure. Pt reports he has been working out in his hotel room and making better food choices. Pt has also been keeping a diary of daily blood pressures.  No other complaints at this time.      History:  History reviewed. No pertinent past medical history.  Past Surgical History:   Procedure Laterality Date    KNEE SURGERY Bilateral      Family History   Problem Relation Age of Onset    Multiple sclerosis Mother     Diabetes Mother     Stroke Father      Social History     Socioeconomic History    Marital status:    Tobacco Use    Smoking status: Every Day     Types: Vaping with nicotine    Smokeless tobacco: Never   Substance and Sexual Activity    Alcohol use: Not Currently    Drug use: Not Currently    Sexual activity: Yes     Partners: Female     Patient Active Problem List   Diagnosis    Severe obesity (BMI 35.0-39.9) with comorbidity    Primary hypertension     Review of patient's allergies indicates:  No Known Allergies    The following were reviewed at this visit: active problem list, medication list, allergies, family history, social history, and health maintenance.    Medications:  Current Outpatient Medications on File Prior to Visit   Medication Sig Dispense Refill    atorvastatin (LIPITOR) 20 MG tablet Take 1 tablet (20 mg total) by mouth once daily. 30 tablet 10    lisinopriL (PRINIVIL,ZESTRIL) 20 MG tablet Take 1 tablet (20 mg total) by mouth once daily. 30 tablet 10     No current facility-administered medications on file prior to visit.       Medications have been reviewed and reconciled with  patient at this visit.  Barriers to medications reviewed with patient.    Adverse reactions to current medications reviewed with patient..    Over the counter medications reviewed and reconciled with patient.    Exam:  Wt Readings from Last 3 Encounters:   12/29/22 (!) 140 kg (308 lb 10.3 oz)   11/21/22 (!) 144.7 kg (319 lb 0.1 oz)   01/31/22 (!) 140.7 kg (310 lb 3 oz)     Temp Readings from Last 3 Encounters:   12/29/22 98.2 °F (36.8 °C) (Temporal)   11/21/22 96.7 °F (35.9 °C) (Tympanic)   01/31/22 98.4 °F (36.9 °C)     BP Readings from Last 3 Encounters:   12/29/22 118/76   11/21/22 (!) 138/100   01/31/22 132/89     Pulse Readings from Last 3 Encounters:   12/29/22 97   11/21/22 82   01/31/22 109     Body mass index is 38.58 kg/m².      Review of Systems   Constitutional: Negative.    HENT: Negative.     Eyes: Negative.    Respiratory: Negative.     Cardiovascular: Negative.    Gastrointestinal: Negative.    Genitourinary: Negative.    Musculoskeletal: Negative.    Skin: Negative.    Neurological: Negative.    Endo/Heme/Allergies: Negative.    Psychiatric/Behavioral: Negative.     Physical Exam  Vitals and nursing note reviewed.   Constitutional:       Appearance: Normal appearance. He is obese.   HENT:      Head: Normocephalic and atraumatic.      Right Ear: Tympanic membrane, ear canal and external ear normal.      Left Ear: Tympanic membrane, ear canal and external ear normal.      Nose: Nose normal.      Mouth/Throat:      Mouth: Mucous membranes are moist.      Pharynx: Oropharynx is clear.   Eyes:      Extraocular Movements: Extraocular movements intact.      Conjunctiva/sclera: Conjunctivae normal.      Pupils: Pupils are equal, round, and reactive to light.   Cardiovascular:      Rate and Rhythm: Normal rate and regular rhythm.      Pulses: Normal pulses.      Heart sounds: Normal heart sounds.   Pulmonary:      Effort: Pulmonary effort is normal.   Abdominal:      General: Bowel sounds are normal.       Palpations: Abdomen is soft.   Musculoskeletal:         General: Normal range of motion.      Cervical back: Normal range of motion and neck supple.   Skin:     General: Skin is warm.      Capillary Refill: Capillary refill takes less than 2 seconds.   Neurological:      General: No focal deficit present.      Mental Status: He is alert and oriented to person, place, and time.   Psychiatric:         Mood and Affect: Mood normal.         Behavior: Behavior normal.         Thought Content: Thought content normal.         Judgment: Judgment normal.       Laboratory Reviewed ({Yes)  Lab Results   Component Value Date    WBC 7.82 11/21/2022    HGB 14.0 11/21/2022    HCT 44.9 11/21/2022     11/21/2022    CHOL 201 (H) 11/21/2022    TRIG 259 (H) 11/21/2022    HDL 44 11/21/2022    ALT 80 (H) 11/21/2022    AST 41 (H) 11/21/2022     11/21/2022    K 4.7 11/21/2022     11/21/2022    CREATININE 0.9 11/21/2022    BUN 14 11/21/2022    CO2 25 11/21/2022    TSH 1.472 11/21/2022    HGBA1C 5.4 11/21/2022       Enrike was seen today for follow-up and hypertension.    Diagnoses and all orders for this visit:    Follow-up exam    Hypertension, unspecified type    Weight loss          Care Plan/Goals: Reviewed    Goals    None     Enrike was seen today for follow-up and hypertension.    Diagnoses and all orders for this visit:    Follow-up exam    Hypertension, unspecified type    Weight loss    Hyperlipidemia, unspecified hyperlipidemia type  -     LIPID PANEL; Future         Follow up: No follow-ups on file.    After visit summary was printed and given to patient upon discharge today.  Patient goals and care plan are included in After Visit Summary.    1/19/23 virtual follow up

## 2023-08-24 ENCOUNTER — PATIENT MESSAGE (OUTPATIENT)
Dept: FAMILY MEDICINE | Facility: CLINIC | Age: 33
End: 2023-08-24
Payer: COMMERCIAL

## 2023-08-24 DIAGNOSIS — G47.30 SLEEP APNEA, UNSPECIFIED TYPE: Primary | ICD-10-CM

## 2023-08-30 ENCOUNTER — ON-DEMAND VIRTUAL (OUTPATIENT)
Dept: URGENT CARE | Facility: CLINIC | Age: 33
End: 2023-08-30
Payer: COMMERCIAL

## 2023-08-30 ENCOUNTER — OFFICE VISIT (OUTPATIENT)
Dept: INTERNAL MEDICINE | Facility: CLINIC | Age: 33
End: 2023-08-30
Payer: COMMERCIAL

## 2023-08-30 VITALS
TEMPERATURE: 99 F | DIASTOLIC BLOOD PRESSURE: 84 MMHG | WEIGHT: 309.88 LBS | HEART RATE: 112 BPM | OXYGEN SATURATION: 97 % | SYSTOLIC BLOOD PRESSURE: 130 MMHG | BODY MASS INDEX: 38.73 KG/M2

## 2023-08-30 DIAGNOSIS — R50.9 FEVER, UNSPECIFIED FEVER CAUSE: Primary | ICD-10-CM

## 2023-08-30 DIAGNOSIS — L02.91 ABSCESS: ICD-10-CM

## 2023-08-30 DIAGNOSIS — L02.91 ABSCESS: Primary | ICD-10-CM

## 2023-08-30 PROCEDURE — 4010F ACE/ARB THERAPY RXD/TAKEN: CPT | Mod: CPTII,S$GLB,, | Performed by: PHYSICIAN ASSISTANT

## 2023-08-30 PROCEDURE — 1160F RVW MEDS BY RX/DR IN RCRD: CPT | Mod: CPTII,S$GLB,, | Performed by: PHYSICIAN ASSISTANT

## 2023-08-30 PROCEDURE — 10061 I&D ABSCESS COMP/MULTIPLE: CPT | Mod: S$GLB,,, | Performed by: PHYSICIAN ASSISTANT

## 2023-08-30 PROCEDURE — 99214 PR OFFICE/OUTPT VISIT, EST, LEVL IV, 30-39 MIN: ICD-10-PCS | Mod: 25,S$GLB,, | Performed by: PHYSICIAN ASSISTANT

## 2023-08-30 PROCEDURE — 99999 PR PBB SHADOW E&M-EST. PATIENT-LVL IV: CPT | Mod: PBBFAC,,, | Performed by: PHYSICIAN ASSISTANT

## 2023-08-30 PROCEDURE — 4010F PR ACE/ARB THEARPY RXD/TAKEN: ICD-10-PCS | Mod: CPTII,S$GLB,, | Performed by: PHYSICIAN ASSISTANT

## 2023-08-30 PROCEDURE — 99999 PR PBB SHADOW E&M-EST. PATIENT-LVL IV: ICD-10-PCS | Mod: PBBFAC,,, | Performed by: PHYSICIAN ASSISTANT

## 2023-08-30 PROCEDURE — 1159F PR MEDICATION LIST DOCUMENTED IN MEDICAL RECORD: ICD-10-PCS | Mod: CPTII,S$GLB,, | Performed by: PHYSICIAN ASSISTANT

## 2023-08-30 PROCEDURE — 3008F BODY MASS INDEX DOCD: CPT | Mod: CPTII,S$GLB,, | Performed by: PHYSICIAN ASSISTANT

## 2023-08-30 PROCEDURE — 99214 OFFICE O/P EST MOD 30 MIN: CPT | Mod: 25,S$GLB,, | Performed by: PHYSICIAN ASSISTANT

## 2023-08-30 PROCEDURE — 3079F DIAST BP 80-89 MM HG: CPT | Mod: CPTII,S$GLB,, | Performed by: PHYSICIAN ASSISTANT

## 2023-08-30 PROCEDURE — 3008F PR BODY MASS INDEX (BMI) DOCUMENTED: ICD-10-PCS | Mod: CPTII,S$GLB,, | Performed by: PHYSICIAN ASSISTANT

## 2023-08-30 PROCEDURE — 1160F PR REVIEW ALL MEDS BY PRESCRIBER/CLIN PHARMACIST DOCUMENTED: ICD-10-PCS | Mod: CPTII,S$GLB,, | Performed by: PHYSICIAN ASSISTANT

## 2023-08-30 PROCEDURE — 3079F PR MOST RECENT DIASTOLIC BLOOD PRESSURE 80-89 MM HG: ICD-10-PCS | Mod: CPTII,S$GLB,, | Performed by: PHYSICIAN ASSISTANT

## 2023-08-30 PROCEDURE — 99213 PR OFFICE/OUTPT VISIT, EST, LEVL III, 20-29 MIN: ICD-10-PCS | Mod: 95,25,S$GLB, | Performed by: NURSE PRACTITIONER

## 2023-08-30 PROCEDURE — 10061 INCISION & DRAINAGE: ICD-10-PCS | Mod: S$GLB,,, | Performed by: PHYSICIAN ASSISTANT

## 2023-08-30 PROCEDURE — 3075F PR MOST RECENT SYSTOLIC BLOOD PRESS GE 130-139MM HG: ICD-10-PCS | Mod: CPTII,S$GLB,, | Performed by: PHYSICIAN ASSISTANT

## 2023-08-30 PROCEDURE — 99213 OFFICE O/P EST LOW 20 MIN: CPT | Mod: 95,25,S$GLB, | Performed by: NURSE PRACTITIONER

## 2023-08-30 PROCEDURE — 3075F SYST BP GE 130 - 139MM HG: CPT | Mod: CPTII,S$GLB,, | Performed by: PHYSICIAN ASSISTANT

## 2023-08-30 PROCEDURE — 1159F MED LIST DOCD IN RCRD: CPT | Mod: CPTII,S$GLB,, | Performed by: PHYSICIAN ASSISTANT

## 2023-08-30 RX ORDER — HYDROCODONE BITARTRATE AND ACETAMINOPHEN 7.5; 325 MG/1; MG/1
1 TABLET ORAL EVERY 6 HOURS PRN
Qty: 20 TABLET | Refills: 0 | Status: SHIPPED | OUTPATIENT
Start: 2023-08-30

## 2023-08-30 RX ORDER — CLINDAMYCIN HYDROCHLORIDE 300 MG/1
300 CAPSULE ORAL EVERY 6 HOURS
Qty: 40 CAPSULE | Refills: 0 | Status: SHIPPED | OUTPATIENT
Start: 2023-08-30 | End: 2023-09-09

## 2023-08-30 RX ORDER — IBUPROFEN 800 MG/1
800 TABLET ORAL EVERY 8 HOURS PRN
Qty: 30 TABLET | Refills: 0 | Status: SHIPPED | OUTPATIENT
Start: 2023-08-30

## 2023-08-30 NOTE — PATIENT INSTRUCTIONS
Apply warm compresses to area over wound dressing for 15-20 minutes followed by changing dressing and applying triple antibiotic ointment and clean dressing 3 times daily.     1. Drink plenty of fluids  2. Get plenty of rest.  3. Take Tylenol as directed on package for pain/fever. Do not take more than package suggests to take.   4. Go to Emergency Department if your symptoms worsen.  5. Follow up with your PCP in 1 week if symptoms persist.

## 2023-08-30 NOTE — PROGRESS NOTES
Subjective:      Patient ID: Enrike Parish is a 33 y.o. male.    Vitals:  vitals were not taken for this visit.     Chief Complaint: Fever (Boil on buttock)      Visit Type: TELE AUDIOVISUAL    Present with the patient at the time of consultation: TELEMED PRESENT WITH PATIENT: spouse    History reviewed. No pertinent past medical history.  Past Surgical History:   Procedure Laterality Date    KNEE SURGERY Bilateral      Review of patient's allergies indicates:  No Known Allergies  Current Outpatient Medications on File Prior to Visit   Medication Sig Dispense Refill    atorvastatin (LIPITOR) 20 MG tablet Take 1 tablet (20 mg total) by mouth once daily. 30 tablet 10    lisinopriL (PRINIVIL,ZESTRIL) 20 MG tablet Take 1 tablet (20 mg total) by mouth once daily. 30 tablet 10     No current facility-administered medications on file prior to visit.     Family History   Problem Relation Age of Onset    Multiple sclerosis Mother     Diabetes Mother     Stroke Father            Ohs Peq Odvv Intake    8/30/2023 10:04 AM CDT - Filed by Patient   Describe your reason for todays visit Boil that has began to ooze fever and chills starter yesterday belive its infected need antibiotics   What is your current physical address in the event of a medical emergency? [address] [place] [address]   Are you able to take your vital signs? No   Please attach any relevant images or files          3-4 days ago noticed boil to buttock to left side (on butt cheek). States that now he is having a fever of 101. Has been trying to use a heating pad. He is requesting some antibiotics to help with his infection/fever.       Fever   This is a new problem. The current episode started in the past 7 days. The problem has been gradually worsening. The maximum temperature noted was 101 to 101.9 F. Pertinent negatives include no abdominal pain, chest pain, congestion, coughing, diarrhea, headaches, muscle aches, nausea, rash, sleepiness, sore  throat, urinary pain, vomiting or wheezing.       Constitution: Positive for fever.   HENT:  Negative for congestion and sore throat.    Cardiovascular:  Negative for chest pain.   Respiratory:  Negative for cough and wheezing.    Gastrointestinal:  Negative for abdominal pain, nausea, vomiting and diarrhea.   Genitourinary:  Negative for dysuria.   Skin:  Negative for rash.   Neurological:  Negative for headaches.        Objective:   The physical exam was conducted virtually.  Physical Exam   Constitutional: He is oriented to person, place, and time. No distress.   HENT:   Head: Normocephalic and atraumatic.   Mouth/Throat: Oropharynx is clear and moist and mucous membranes are normal.   Eyes: Conjunctivae are normal. No scleral icterus.   Pulmonary/Chest: Effort normal. No respiratory distress.   Musculoskeletal: Normal range of motion.         General: Normal range of motion.   Neurological: He is alert and oriented to person, place, and time.   Skin: Skin is not diaphoretic.        Psychiatric: His behavior is normal. Judgment and thought content normal.   Vitals reviewed.      Assessment:     1. Fever, unspecified fever cause    2. Abscess        Plan:     Discussed that with fever and abscess, needs in person exam for IM antibiotic injection, wound culture, I & D, and to start on oral meds. [facility] and are aware of Urgent Care in .     Fever, unspecified fever cause  -     Ambulatory referral/consult to Urgent Care    Abscess  -     Ambulatory referral/consult to Urgent Care                  There are no Patient Instructions on file for this visit.

## 2023-08-30 NOTE — LETTER
August 30, 2023      [facility]  [id number] [place] [id number]  Phone: [phone]  Fax: [phone]       Patient: Enrike Parish   YOB: 1990  Date of Visit: 08/30/2023    To Whom It May Concern:    Unique Parish  was at [facility] on 08/30/2023. He may return to work/school on 9/6/23 with no restrictions. If you have any questions or concerns, or if I can be of further assistance, please do not hesitate to contact me.    Sincerely,    Sophia Linder PA-C

## 2023-08-31 ENCOUNTER — OFFICE VISIT (OUTPATIENT)
Dept: INTERNAL MEDICINE | Facility: CLINIC | Age: 33
End: 2023-08-31
Payer: COMMERCIAL

## 2023-08-31 VITALS
OXYGEN SATURATION: 97 % | HEART RATE: 104 BPM | BODY MASS INDEX: 39.06 KG/M2 | DIASTOLIC BLOOD PRESSURE: 82 MMHG | TEMPERATURE: 99 F | WEIGHT: 312.5 LBS | SYSTOLIC BLOOD PRESSURE: 148 MMHG

## 2023-08-31 DIAGNOSIS — L02.91 ABSCESS: Primary | ICD-10-CM

## 2023-08-31 PROCEDURE — 3008F PR BODY MASS INDEX (BMI) DOCUMENTED: ICD-10-PCS | Mod: CPTII,S$GLB,, | Performed by: PHYSICIAN ASSISTANT

## 2023-08-31 PROCEDURE — 1160F RVW MEDS BY RX/DR IN RCRD: CPT | Mod: CPTII,S$GLB,, | Performed by: PHYSICIAN ASSISTANT

## 2023-08-31 PROCEDURE — 1159F MED LIST DOCD IN RCRD: CPT | Mod: CPTII,S$GLB,, | Performed by: PHYSICIAN ASSISTANT

## 2023-08-31 PROCEDURE — 99999 PR PBB SHADOW E&M-EST. PATIENT-LVL III: CPT | Mod: PBBFAC,,, | Performed by: PHYSICIAN ASSISTANT

## 2023-08-31 PROCEDURE — 3079F PR MOST RECENT DIASTOLIC BLOOD PRESSURE 80-89 MM HG: ICD-10-PCS | Mod: CPTII,S$GLB,, | Performed by: PHYSICIAN ASSISTANT

## 2023-08-31 PROCEDURE — 1160F PR REVIEW ALL MEDS BY PRESCRIBER/CLIN PHARMACIST DOCUMENTED: ICD-10-PCS | Mod: CPTII,S$GLB,, | Performed by: PHYSICIAN ASSISTANT

## 2023-08-31 PROCEDURE — 3077F SYST BP >= 140 MM HG: CPT | Mod: CPTII,S$GLB,, | Performed by: PHYSICIAN ASSISTANT

## 2023-08-31 PROCEDURE — 1159F PR MEDICATION LIST DOCUMENTED IN MEDICAL RECORD: ICD-10-PCS | Mod: CPTII,S$GLB,, | Performed by: PHYSICIAN ASSISTANT

## 2023-08-31 PROCEDURE — 3008F BODY MASS INDEX DOCD: CPT | Mod: CPTII,S$GLB,, | Performed by: PHYSICIAN ASSISTANT

## 2023-08-31 PROCEDURE — 99999 PR PBB SHADOW E&M-EST. PATIENT-LVL III: ICD-10-PCS | Mod: PBBFAC,,, | Performed by: PHYSICIAN ASSISTANT

## 2023-08-31 PROCEDURE — 99024 POSTOP FOLLOW-UP VISIT: CPT | Mod: S$GLB,,, | Performed by: PHYSICIAN ASSISTANT

## 2023-08-31 PROCEDURE — 4010F ACE/ARB THERAPY RXD/TAKEN: CPT | Mod: CPTII,S$GLB,, | Performed by: PHYSICIAN ASSISTANT

## 2023-08-31 PROCEDURE — 99024 PR POST-OP FOLLOW-UP VISIT: ICD-10-PCS | Mod: S$GLB,,, | Performed by: PHYSICIAN ASSISTANT

## 2023-08-31 PROCEDURE — 3079F DIAST BP 80-89 MM HG: CPT | Mod: CPTII,S$GLB,, | Performed by: PHYSICIAN ASSISTANT

## 2023-08-31 PROCEDURE — 3077F PR MOST RECENT SYSTOLIC BLOOD PRESSURE >= 140 MM HG: ICD-10-PCS | Mod: CPTII,S$GLB,, | Performed by: PHYSICIAN ASSISTANT

## 2023-08-31 PROCEDURE — 4010F PR ACE/ARB THEARPY RXD/TAKEN: ICD-10-PCS | Mod: CPTII,S$GLB,, | Performed by: PHYSICIAN ASSISTANT

## 2023-08-31 NOTE — PROGRESS NOTES
Subjective:       Patient ID: Enrike Parish is a 33 y.o. male.    Chief Complaint: Abscess      HPI  33-year-old male presents to clinic with complaints of 3 day history of an abscess to his left medial buttock that is constant and worsening.  He does report purulent and bloody drainage.  He also reports fever and chills.  He tried a topical salve without relief.  He denies history of recurrent abscesses or skin infections.    Review of Systems   Constitutional:  Positive for chills and fever. Negative for fatigue.   Gastrointestinal:  Negative for nausea and vomiting.   Musculoskeletal:  Positive for myalgias.   Skin:  Positive for wound.       Objective:      Physical Exam  Vitals and nursing note reviewed.   Constitutional:       General: He is not in acute distress.     Appearance: He is well-developed. He is obese.   HENT:      Head: Normocephalic and atraumatic.   Eyes:      General: Lids are normal. No scleral icterus.     Extraocular Movements: Extraocular movements intact.      Conjunctiva/sclera: Conjunctivae normal.   Pulmonary:      Effort: Pulmonary effort is normal.   Skin:         Neurological:      Mental Status: He is alert.      Cranial Nerves: No cranial nerve deficit.   Psychiatric:         Mood and Affect: Mood and affect normal.         Assessment:       1. Abscess        Plan:   1. Abscess  -     HYDROcodone-acetaminophen (NORCO) 7.5-325 mg per tablet; Take 1 tablet by mouth every 6 (six) hours as needed for Pain.  Dispense: 20 tablet; Refill: 0  -     clindamycin (CLEOCIN) 300 MG capsule; Take 1 capsule (300 mg total) by mouth every 6 (six) hours. for 10 days  Dispense: 40 capsule; Refill: 0  -     ibuprofen (ADVIL,MOTRIN) 800 MG tablet; Take 1 tablet (800 mg total) by mouth every 8 (eight) hours as needed for Pain (or fever). Take with Food.  Dispense: 30 tablet; Refill: 0  -     Incision & Drainage      Wound care instructions  RTC tomorrow for wound recheck  RTC next week also packing  recheck

## 2023-08-31 NOTE — PROCEDURES
"Incision & Drainage    Date/Time: 8/30/2023 1:20 PM    Performed by: Sophia Linder PA-C  Authorized by: Sophia Linder PA-C    Time out: Immediately prior to procedure a "time out" was called to verify the correct patient, procedure, equipment, support staff and site/side marked as required.    Consent Done?:  Yes (Verbal)    Type:  Abscess  Body area:  Trunk  Location details:  Back  Anesthesia:  Local infiltration  Local anesthetic: Lidocaine 1% without epinephrine  Anesthetic total (ml):  10  Scalpel size:  11  Incision type:  Single straight  Incision depth: subcutaneous    Complexity:  Complex  Drainage:  Purulent and bloody  Drainage amount:  Moderate  Wound treatment:  Incision, drainage, wound left open, deloculation, expression of material and wound packed  Packing material:  1/4 in iodoform gauze  Patient tolerance:  Patient tolerated the procedure well with no immediate complications  Pain Assessment: 2    "

## 2023-09-01 NOTE — PROGRESS NOTES
Subjective:       Patient ID: Enrike Parish is a 33 y.o. male.    Chief Complaint: Recurrent Skin Infections      HPI  Patient here for follow-up abscess incision and drainage performed by myself yesterday.  Patient doing well.  Still with slight fever.  Performing wound care 3 times daily as instructed yesterday.  Still reports purulent drainage from wound but wound area is improving in regards to swelling and pain.  He is currently taking his antibiotics.    Review of Systems   Constitutional:  Negative for chills and fever.       Objective:      Physical Exam  Skin:               Assessment:       1. Abscess        Plan:   1. Abscess    Return to clinic on Tuesday next week for wound recheck and removal of packing and determine if additional packing needs to be placed.  Continue antibiotics and ibuprofen 800.

## 2023-09-05 ENCOUNTER — OFFICE VISIT (OUTPATIENT)
Dept: INTERNAL MEDICINE | Facility: CLINIC | Age: 33
End: 2023-09-05
Payer: COMMERCIAL

## 2023-09-05 VITALS
BODY MASS INDEX: 39.47 KG/M2 | DIASTOLIC BLOOD PRESSURE: 88 MMHG | OXYGEN SATURATION: 97 % | WEIGHT: 315 LBS | SYSTOLIC BLOOD PRESSURE: 138 MMHG | HEART RATE: 100 BPM | TEMPERATURE: 98 F

## 2023-09-05 DIAGNOSIS — L02.91 ABSCESS: Primary | ICD-10-CM

## 2023-09-05 DIAGNOSIS — I10 ESSENTIAL HYPERTENSION: ICD-10-CM

## 2023-09-05 PROCEDURE — 99024 POSTOP FOLLOW-UP VISIT: CPT | Mod: S$GLB,,, | Performed by: PHYSICIAN ASSISTANT

## 2023-09-05 PROCEDURE — 3079F PR MOST RECENT DIASTOLIC BLOOD PRESSURE 80-89 MM HG: ICD-10-PCS | Mod: CPTII,S$GLB,, | Performed by: PHYSICIAN ASSISTANT

## 2023-09-05 PROCEDURE — 99024 PR POST-OP FOLLOW-UP VISIT: ICD-10-PCS | Mod: S$GLB,,, | Performed by: PHYSICIAN ASSISTANT

## 2023-09-05 PROCEDURE — 99999 PR PBB SHADOW E&M-EST. PATIENT-LVL III: CPT | Mod: PBBFAC,,, | Performed by: PHYSICIAN ASSISTANT

## 2023-09-05 PROCEDURE — 4010F PR ACE/ARB THEARPY RXD/TAKEN: ICD-10-PCS | Mod: CPTII,S$GLB,, | Performed by: PHYSICIAN ASSISTANT

## 2023-09-05 PROCEDURE — 3079F DIAST BP 80-89 MM HG: CPT | Mod: CPTII,S$GLB,, | Performed by: PHYSICIAN ASSISTANT

## 2023-09-05 PROCEDURE — 3075F PR MOST RECENT SYSTOLIC BLOOD PRESS GE 130-139MM HG: ICD-10-PCS | Mod: CPTII,S$GLB,, | Performed by: PHYSICIAN ASSISTANT

## 2023-09-05 PROCEDURE — 1160F RVW MEDS BY RX/DR IN RCRD: CPT | Mod: CPTII,S$GLB,, | Performed by: PHYSICIAN ASSISTANT

## 2023-09-05 PROCEDURE — 4010F ACE/ARB THERAPY RXD/TAKEN: CPT | Mod: CPTII,S$GLB,, | Performed by: PHYSICIAN ASSISTANT

## 2023-09-05 PROCEDURE — 1160F PR REVIEW ALL MEDS BY PRESCRIBER/CLIN PHARMACIST DOCUMENTED: ICD-10-PCS | Mod: CPTII,S$GLB,, | Performed by: PHYSICIAN ASSISTANT

## 2023-09-05 PROCEDURE — 3008F BODY MASS INDEX DOCD: CPT | Mod: CPTII,S$GLB,, | Performed by: PHYSICIAN ASSISTANT

## 2023-09-05 PROCEDURE — 3008F PR BODY MASS INDEX (BMI) DOCUMENTED: ICD-10-PCS | Mod: CPTII,S$GLB,, | Performed by: PHYSICIAN ASSISTANT

## 2023-09-05 PROCEDURE — 1159F MED LIST DOCD IN RCRD: CPT | Mod: CPTII,S$GLB,, | Performed by: PHYSICIAN ASSISTANT

## 2023-09-05 PROCEDURE — 99999 PR PBB SHADOW E&M-EST. PATIENT-LVL III: ICD-10-PCS | Mod: PBBFAC,,, | Performed by: PHYSICIAN ASSISTANT

## 2023-09-05 PROCEDURE — 1159F PR MEDICATION LIST DOCUMENTED IN MEDICAL RECORD: ICD-10-PCS | Mod: CPTII,S$GLB,, | Performed by: PHYSICIAN ASSISTANT

## 2023-09-05 PROCEDURE — 3075F SYST BP GE 130 - 139MM HG: CPT | Mod: CPTII,S$GLB,, | Performed by: PHYSICIAN ASSISTANT

## 2023-09-06 NOTE — PROGRESS NOTES
Subjective:       Patient ID: Enrike Parish is a 33 y.o. male.    Chief Complaint: Abscess      HPI  Patient here for abscess recheck.  Reports packing did fall out 2 days ago.  He is still performing wound care instructions and taking antibiotics.  He reports doing much better.  No fevers at home.    Review of Systems   Constitutional:  Negative for fever.   Skin:  Positive for wound.       Objective:      Physical Exam  Vitals and nursing note reviewed.   Constitutional:       General: He is not in acute distress.     Appearance: He is well-developed.   HENT:      Head: Normocephalic and atraumatic.   Eyes:      General: Lids are normal. No scleral icterus.     Extraocular Movements: Extraocular movements intact.      Conjunctiva/sclera: Conjunctivae normal.   Pulmonary:      Effort: Pulmonary effort is normal.   Skin:         Neurological:      Mental Status: He is alert.      Cranial Nerves: No cranial nerve deficit.   Psychiatric:         Mood and Affect: Mood and affect normal.         Assessment:       1. Abscess    2. Essential hypertension        Plan:   1. Abscess    2. Essential hypertension  Assessment & Plan:  /88, continue lisinopril

## 2024-03-28 ENCOUNTER — OFFICE VISIT (OUTPATIENT)
Dept: FAMILY MEDICINE | Facility: CLINIC | Age: 34
End: 2024-03-28
Payer: COMMERCIAL

## 2024-03-28 VITALS
DIASTOLIC BLOOD PRESSURE: 90 MMHG | HEART RATE: 103 BPM | BODY MASS INDEX: 39.17 KG/M2 | RESPIRATION RATE: 18 BRPM | TEMPERATURE: 98 F | OXYGEN SATURATION: 97 % | SYSTOLIC BLOOD PRESSURE: 146 MMHG | WEIGHT: 315 LBS | HEIGHT: 75 IN

## 2024-03-28 DIAGNOSIS — E66.01 MORBID OBESITY: ICD-10-CM

## 2024-03-28 DIAGNOSIS — Z00.00 WELLNESS EXAMINATION: ICD-10-CM

## 2024-03-28 DIAGNOSIS — G47.30 SLEEP APNEA, UNSPECIFIED TYPE: Primary | ICD-10-CM

## 2024-03-28 DIAGNOSIS — E78.5 HYPERLIPIDEMIA, UNSPECIFIED HYPERLIPIDEMIA TYPE: ICD-10-CM

## 2024-03-28 DIAGNOSIS — I10 ESSENTIAL HYPERTENSION: ICD-10-CM

## 2024-03-28 DIAGNOSIS — G47.39 OTHER SLEEP APNEA: ICD-10-CM

## 2024-03-28 DIAGNOSIS — E66.01 SEVERE OBESITY (BMI 35.0-39.9) WITH COMORBIDITY: ICD-10-CM

## 2024-03-28 PROBLEM — M31.0 HYPERSENSITIVITY ANGIITIS: Status: ACTIVE | Noted: 2024-03-28

## 2024-03-28 PROCEDURE — 99999 PR PBB SHADOW E&M-EST. PATIENT-LVL III: CPT | Mod: PBBFAC,,, | Performed by: NURSE PRACTITIONER

## 2024-03-28 PROCEDURE — 3077F SYST BP >= 140 MM HG: CPT | Mod: CPTII,S$GLB,, | Performed by: NURSE PRACTITIONER

## 2024-03-28 PROCEDURE — 1159F MED LIST DOCD IN RCRD: CPT | Mod: CPTII,S$GLB,, | Performed by: NURSE PRACTITIONER

## 2024-03-28 PROCEDURE — 3080F DIAST BP >= 90 MM HG: CPT | Mod: CPTII,S$GLB,, | Performed by: NURSE PRACTITIONER

## 2024-03-28 PROCEDURE — 3008F BODY MASS INDEX DOCD: CPT | Mod: CPTII,S$GLB,, | Performed by: NURSE PRACTITIONER

## 2024-03-28 PROCEDURE — 99214 OFFICE O/P EST MOD 30 MIN: CPT | Mod: S$GLB,,, | Performed by: NURSE PRACTITIONER

## 2024-03-28 PROCEDURE — 1160F RVW MEDS BY RX/DR IN RCRD: CPT | Mod: CPTII,S$GLB,, | Performed by: NURSE PRACTITIONER

## 2024-03-28 PROCEDURE — 4010F ACE/ARB THERAPY RXD/TAKEN: CPT | Mod: CPTII,S$GLB,, | Performed by: NURSE PRACTITIONER

## 2024-03-28 RX ORDER — LOSARTAN POTASSIUM 25 MG/1
25 TABLET ORAL DAILY
Qty: 30 TABLET | Refills: 3 | Status: SHIPPED | OUTPATIENT
Start: 2024-03-28 | End: 2024-06-05 | Stop reason: SDUPTHER

## 2024-03-28 RX ORDER — DIETHYLPROPION HYDROCHLORIDE 75 MG/1
75 TABLET, EXTENDED RELEASE ORAL EVERY MORNING
Qty: 30 TABLET | Refills: 3 | Status: SHIPPED | OUTPATIENT
Start: 2024-03-28 | End: 2024-04-26 | Stop reason: SDUPTHER

## 2024-03-28 RX ORDER — ATORVASTATIN CALCIUM 20 MG/1
20 TABLET, FILM COATED ORAL DAILY
Qty: 30 TABLET | Refills: 10 | Status: SHIPPED | OUTPATIENT
Start: 2024-03-28 | End: 2024-06-10 | Stop reason: SDUPTHER

## 2024-03-28 NOTE — PROGRESS NOTES
"Enrike HOFFMAN Jem  03/28/2024  [id number]    Alma Russell, NP  Patient Care Team:  Alma Russell NP as PCP - General (Family Medicine)          Visit Type:a scheduled routine follow-up visit    Chief Complaint:  No chief complaint on file.      History of Present Illness:    33 year old male presents today with multiple complaints. Pt is complaining of obesity and requesting help losing weight. Reports he does not have a traditional job and finds it difficult to maintain a balanced diet.   Pt is also reports he has not taken blood pressure medication because it makes him "feel weird". States he has been drinking apple cider vinegar daily and has improved both his mood and blood pressure.   Pt also admits he has not taken his cholesterol medication for follow up of lab results and plan of care.   Pt states if he is unable to lose weight in the next 90 days he would like to consider gastric sleeve  No other complaints at this time.      History:  History reviewed. No pertinent past medical history.  Past Surgical History:   Procedure Laterality Date    KNEE SURGERY Bilateral      Family History   Problem Relation Age of Onset    Multiple sclerosis Mother     Diabetes Mother     Stroke Father     Diabetes Maternal Uncle      Social History     Socioeconomic History    Marital status:    Tobacco Use    Smoking status: Every Day     Types: Vaping with nicotine    Smokeless tobacco: Never   Substance and Sexual Activity    Alcohol use: Not Currently    Drug use: Not Currently    Sexual activity: Yes     Partners: Female     Birth control/protection: I.U.D.     Patient Active Problem List   Diagnosis    Severe obesity (BMI 35.0-39.9) with comorbidity    Essential hypertension    Hypersensitivity angiitis    Hyperlipidemia    Other sleep apnea     Review of patient's allergies indicates:  No Known Allergies    The following were reviewed at this visit: active problem list, medication list, allergies, " family history, social history, and health maintenance.    Medications:  Current Outpatient Medications on File Prior to Visit   Medication Sig Dispense Refill    HYDROcodone-acetaminophen (NORCO) 7.5-325 mg per tablet Take 1 tablet by mouth every 6 (six) hours as needed for Pain. (Patient not taking: Reported on 3/28/2024) 20 tablet 0    ibuprofen (ADVIL,MOTRIN) 800 MG tablet Take 1 tablet (800 mg total) by mouth every 8 (eight) hours as needed for Pain (or fever). Take with Food. (Patient not taking: Reported on 3/28/2024) 30 tablet 0    [DISCONTINUED] atorvastatin (LIPITOR) 20 MG tablet Take 1 tablet (20 mg total) by mouth once daily. 30 tablet 10    [DISCONTINUED] lisinopriL (PRINIVIL,ZESTRIL) 20 MG tablet Take 1 tablet (20 mg total) by mouth once daily. 30 tablet 10     No current facility-administered medications on file prior to visit.       Medications have been reviewed and reconciled with patient at this visit.  Barriers to medications reviewed with patient.    Adverse reactions to current medications reviewed with patient..    Over the counter medications reviewed and reconciled with patient.    Exam:  Wt Readings from Last 3 Encounters:   03/28/24 (!) 154.8 kg (341 lb 4.4 oz)   09/05/23 (!) 143.2 kg (315 lb 12.9 oz)   08/31/23 (!) 141.7 kg (312 lb 8 oz)     Temp Readings from Last 3 Encounters:   03/28/24 98.3 °F (36.8 °C) (Tympanic)   09/05/23 97.9 °F (36.6 °C) (Tympanic)   08/31/23 99.4 °F (37.4 °C) (Tympanic)     BP Readings from Last 3 Encounters:   03/28/24 (!) 146/90   09/05/23 138/88   08/31/23 (!) 148/82     Pulse Readings from Last 3 Encounters:   03/28/24 103   09/05/23 100   08/31/23 104     Body mass index is 42.66 kg/m².      Review of Systems   Constitutional:  Negative for fever.   Respiratory:  Negative for cough, shortness of breath and wheezing.    Cardiovascular:  Negative for chest pain and palpitations.   Gastrointestinal:  Negative for nausea.   Neurological:  Negative for speech  change, weakness and headaches.   All other systems reviewed and are negative.    Physical Exam  Vitals and nursing note reviewed.   Constitutional:       Appearance: Normal appearance. He is obese.   HENT:      Head: Normocephalic and atraumatic.      Right Ear: Tympanic membrane, ear canal and external ear normal.      Left Ear: Tympanic membrane, ear canal and external ear normal.      Nose: Nose normal.      Mouth/Throat:      Mouth: Mucous membranes are moist.      Pharynx: Oropharynx is clear.   Eyes:      Extraocular Movements: Extraocular movements intact.      Conjunctiva/sclera: Conjunctivae normal.      Pupils: Pupils are equal, round, and reactive to light.   Cardiovascular:      Rate and Rhythm: Normal rate and regular rhythm.      Pulses: Normal pulses.      Heart sounds: Normal heart sounds.   Pulmonary:      Effort: Pulmonary effort is normal.      Breath sounds: Normal breath sounds.   Abdominal:      General: Bowel sounds are normal.      Palpations: Abdomen is soft.   Musculoskeletal:         General: Normal range of motion.      Cervical back: Normal range of motion and neck supple.   Skin:     General: Skin is warm and dry.      Capillary Refill: Capillary refill takes less than 2 seconds.   Neurological:      General: No focal deficit present.      Mental Status: He is alert and oriented to person, place, and time.   Psychiatric:         Mood and Affect: Mood normal.         Behavior: Behavior normal.         Thought Content: Thought content normal.         Judgment: Judgment normal.         Laboratory Reviewed ({Yes)  Lab Results   Component Value Date    WBC 7.82 11/21/2022    HGB 14.0 11/21/2022    HCT 44.9 11/21/2022     11/21/2022    CHOL 135 12/29/2022    TRIG 199 (H) 12/29/2022    HDL 37 (L) 12/29/2022    ALT 80 (H) 11/21/2022    AST 41 (H) 11/21/2022     11/21/2022    K 4.7 11/21/2022     11/21/2022    CREATININE 0.9 11/21/2022    BUN 14 11/21/2022    CO2 25  11/21/2022    TSH 1.472 11/21/2022    INR 1.1 12/27/2023    HGBA1C 5.4 11/21/2022       Diagnoses and all orders for this visit:    Sleep apnea, unspecified type  -     Ambulatory referral/consult to Sleep Disorders; Future    Morbid obesity  -     diethylpropion (TENUATE) 75 mg SR tablet; Take 1 tablet (75 mg total) by mouth every morning.    Essential hypertension  -     losartan (COZAAR) 25 MG tablet; Take 1 tablet (25 mg total) by mouth once daily.    Hyperlipidemia, unspecified hyperlipidemia type  -     atorvastatin (LIPITOR) 20 MG tablet; Take 1 tablet (20 mg total) by mouth once daily.    Wellness examination  -     Lipid Panel; Standing  -     T4, Free; Standing  -     TSH; Standing  -     Hemoglobin A1C; Standing  -     Comprehensive Metabolic Panel; Standing  -     CBC Auto Differential; Standing  -     TESTOSTERONE PANEL; Standing    Severe obesity (BMI 35.0-39.9) with comorbidity    Other sleep apnea        Plan   Labs   Sleep study  Stop lisinopril and start losartan   Start tenuate for weight loss  Start lipitor   Start low fat diet and exercise 45 min a week        Care Plan/Goals: Reviewed    Goals    None     Diagnoses and all orders for this visit:    Sleep apnea, unspecified type  -     Ambulatory referral/consult to Sleep Disorders; Future    Morbid obesity  -     diethylpropion (TENUATE) 75 mg SR tablet; Take 1 tablet (75 mg total) by mouth every morning.    Essential hypertension  -     losartan (COZAAR) 25 MG tablet; Take 1 tablet (25 mg total) by mouth once daily.    Hyperlipidemia, unspecified hyperlipidemia type  -     atorvastatin (LIPITOR) 20 MG tablet; Take 1 tablet (20 mg total) by mouth once daily.    Wellness examination  -     Lipid Panel; Standing  -     T4, Free; Standing  -     TSH; Standing  -     Hemoglobin A1C; Standing  -     Comprehensive Metabolic Panel; Standing  -     CBC Auto Differential; Standing  -     TESTOSTERONE PANEL; Standing    Severe obesity (BMI 35.0-39.9)  with comorbidity    Other sleep apnea         Follow up: Follow up in about 3 months (around 6/28/2024).    After visit summary was printed and given to patient upon discharge today.  Patient goals and care plan are included in After Visit Summary.

## 2024-04-19 ENCOUNTER — TELEPHONE (OUTPATIENT)
Dept: SLEEP MEDICINE | Facility: CLINIC | Age: 34
End: 2024-04-19
Payer: COMMERCIAL

## 2024-04-19 ENCOUNTER — OFFICE VISIT (OUTPATIENT)
Dept: PULMONOLOGY | Facility: CLINIC | Age: 34
End: 2024-04-19
Payer: COMMERCIAL

## 2024-04-19 VITALS — HEIGHT: 75 IN | BODY MASS INDEX: 39.17 KG/M2 | WEIGHT: 315 LBS

## 2024-04-19 DIAGNOSIS — E66.01 SEVERE OBESITY (BMI 35.0-39.9) WITH COMORBIDITY: ICD-10-CM

## 2024-04-19 DIAGNOSIS — G47.30 SLEEP APNEA, UNSPECIFIED TYPE: ICD-10-CM

## 2024-04-19 DIAGNOSIS — G47.30 SLEEP-DISORDERED BREATHING: Primary | ICD-10-CM

## 2024-04-19 DIAGNOSIS — I10 ESSENTIAL HYPERTENSION: ICD-10-CM

## 2024-04-19 PROCEDURE — 99203 OFFICE O/P NEW LOW 30 MIN: CPT | Mod: 95,,, | Performed by: PHYSICIAN ASSISTANT

## 2024-04-19 PROCEDURE — 4010F ACE/ARB THERAPY RXD/TAKEN: CPT | Mod: CPTII,95,, | Performed by: PHYSICIAN ASSISTANT

## 2024-04-19 PROCEDURE — 1160F RVW MEDS BY RX/DR IN RCRD: CPT | Mod: CPTII,95,, | Performed by: PHYSICIAN ASSISTANT

## 2024-04-19 PROCEDURE — 1159F MED LIST DOCD IN RCRD: CPT | Mod: CPTII,95,, | Performed by: PHYSICIAN ASSISTANT

## 2024-04-19 PROCEDURE — 3008F BODY MASS INDEX DOCD: CPT | Mod: CPTII,95,, | Performed by: PHYSICIAN ASSISTANT

## 2024-04-19 NOTE — PROGRESS NOTES
Subjective:       Patient ID: Enrike Parish is a 33 y.o. male.    Chief Complaint: CODEY    The patient location is: home in [place]  The chief complaint leading to consultation is: CODEY    Visit type: audiovisual    Face to Face time with patient: 20 minutes  25 minutes of total time spent on the encounter, which includes face to face time and non-face to face time preparing to see the patient (eg, review of tests), Obtaining and/or reviewing separately obtained history, Documenting clinical information in the electronic or other health record, Independently interpreting results (not separately reported) and communicating results to the patient/family/caregiver, or Care coordination (not separately reported).         Each patient to whom he or she provides medical services by telemedicine is:  (1) informed of the relationship between the physician and patient and the respective role of any other health care provider with respect to management of the patient; and (2) notified that he or she may decline to receive medical services by telemedicine and may withdraw from such care at any time.    Notes:     New patient here for sleep apnea  Snores, witnessed apnea, wakes up with dry mouth  No set sleep times; works shift work, [employer]   Gets 3-5 hours of sleep a night  Never feels rested  Has daytime fatigue  No sleep aids  No alcohol or caffeine  Wants CPAP if indicated    BP Readings from Last 3 Encounters:   03/28/24 (!) 146/90   09/05/23 138/88   08/31/23 (!) 148/82     Snoring / Sleep:     Zarephath Questionnaire (validated CODEY screening questionnaire)    yes -- Snoring/apnea    yes -- Fatigue    Body mass index is 42.66 kg/m².  (>25 is overweight, >30 is obese)    Blood Pressure = Hypertension  (PreHTN 120-139/80-89, Stg1 140-159/90-99, Stg2 >160/>100)  Z[place]ath = 3 of three CODEY categories are positive (high risk is 2-3 positive categories)     [place] Sleepiness Scale TOTAL =   15  (validated sleepiness  questionnaire with a higher score indicating greater sleepiness; range 0-24)        STOP-Bang Questionnaire (validated CODEY screening questionnaire)  Negative unless checked off.  [x] Snoring    [x]  Tired/Fatigued/Sleepy  [x] Obstruction (apneas/choking)  [x] Pressure (HTN)  [x] BMI >35  [] Age >50  [x] Neck >40 cm  [x] Gender male   STOP-Bang = 7 (low risk 0-2,high risk 3-8)    Immunization History   Administered Date(s) Administered    DTP 1990, 1990, 1990, 08/05/1992    HIB 1990, 1990, 1991, 08/05/1992    MMR 08/05/1992    OPV 1991, 1991, 08/05/1992    Tdap 08/18/2020      Tobacco Use: High Risk (4/19/2024)    Patient History     Smoking Tobacco Use: Every Day     Smokeless Tobacco Use: Never     Passive Exposure: Not on file      No past medical history on file.   Current Outpatient Medications on File Prior to Visit   Medication Sig Dispense Refill    atorvastatin (LIPITOR) 20 MG tablet Take 1 tablet (20 mg total) by mouth once daily. 30 tablet 10    diethylpropion (TENUATE) 75 mg SR tablet Take 1 tablet (75 mg total) by mouth every morning. 30 tablet 3    HYDROcodone-acetaminophen (NORCO) 7.5-325 mg per tablet Take 1 tablet by mouth every 6 (six) hours as needed for Pain. (Patient not taking: Reported on 3/28/2024) 20 tablet 0    ibuprofen (ADVIL,MOTRIN) 800 MG tablet Take 1 tablet (800 mg total) by mouth every 8 (eight) hours as needed for Pain (or fever). Take with Food. (Patient not taking: Reported on 3/28/2024) 30 tablet 0    losartan (COZAAR) 25 MG tablet Take 1 tablet (25 mg total) by mouth once daily. 30 tablet 3     No current facility-administered medications on file prior to visit.        Review of Systems   Constitutional:  Negative for fever, weight loss, appetite change and weakness.   HENT:  Negative for postnasal drip, rhinorrhea, sinus pressure, trouble swallowing and congestion.    Respiratory:  Positive for apnea, snoring and somnolence.  "Negative for cough, sputum production, choking, chest tightness, shortness of breath, wheezing and dyspnea on extertion.    Cardiovascular:  Negative for chest pain and leg swelling.   Musculoskeletal:  Negative for joint swelling.   Gastrointestinal:  Negative for nausea, vomiting and abdominal pain.   Neurological:  Negative for dizziness, weakness and headaches.   All other systems reviewed and are negative.      Objective:       Vitals:    04/19/24 1310   Weight: (!) 154.8 kg (341 lb 4.4 oz)   Height: 6' 3" (1.905 m)       Physical Exam   Constitutional: He is oriented to person, place, and time. He appears well-developed and well-nourished. No distress. He is obese.   HENT:   Mouth/Throat: Oropharynx is clear and moist.   Pulmonary/Chest: Effort normal. No respiratory distress.   Musculoskeletal:         General: No edema.      Cervical back: Normal range of motion.   Neurological: He is alert and oriented to person, place, and time.   Skin: No rash noted.   Psychiatric: He has a normal mood and affect.   Vitals reviewed.    Personal Diagnostic Review    No image results found.              4/19/2024     1:10 PM 3/28/2024    11:53 AM 9/5/2023     3:25 PM 8/31/2023     3:29 PM 8/30/2023     1:17 PM 12/29/2022     8:08 AM 11/21/2022     7:39 AM   Pulmonary Function Tests   SpO2  97 % 97 % 97 % 97 % 99 % 97 %   Height 6' 3" (1.905 m) 6' 3" (1.905 m)    6' 3" (1.905 m) 6' 3" (1.905 m)   Weight 154.8 kg (341 lb 4.4 oz) 154.8 kg (341 lb 4.4 oz) 143.2 kg (315 lb 12.9 oz) 141.7 kg (312 lb 8 oz) 140.5 kg (309 lb 13.7 oz) 140 kg (308 lb 10.3 oz) 144.7 kg (319 lb 0.1 oz)   BMI (Calculated) 42.7 42.7    38.6 39.9         Assessment/Plan:       STOPbang 7, [place] 3, Ep 15  Home sleep study  CODEY and implications on health discussed    Problem List Items Addressed This Visit          Cardiac/Vascular    Essential hypertension     F/u regularly with PCP              Endocrine    Severe obesity (BMI 35.0-39.9) with comorbidity "     Weight loss and exercise to improve overall health.            Other Visit Diagnoses       Sleep-disordered breathing    -  Primary    Relevant Orders    Home Sleep Study    Sleep apnea, unspecified type              Follow up after sleep study to review. CPAP will be ordered if indicated.     Discussed diagnosis, its evaluation, treatment and usual course. All questions answered.    Patient verbalized understanding of plan and left in no acute distress    Thank you for the courtesy of participating in the care of this patient    Selene Oseguera PA-C  [facility]

## 2024-04-19 NOTE — TELEPHONE ENCOUNTER
----- Message from James Michel sent at 4/19/2024  1:25 PM CDT -----  [facility]

## 2024-04-26 DIAGNOSIS — E66.01 MORBID OBESITY: ICD-10-CM

## 2024-04-26 RX ORDER — DIETHYLPROPION HYDROCHLORIDE 75 MG/1
75 TABLET, EXTENDED RELEASE ORAL EVERY MORNING
Qty: 30 TABLET | Refills: 0 | Status: SHIPPED | OUTPATIENT
Start: 2024-04-26 | End: 2024-05-31 | Stop reason: SDUPTHER

## 2024-04-29 ENCOUNTER — HOSPITAL ENCOUNTER (OUTPATIENT)
Dept: SLEEP MEDICINE | Facility: HOSPITAL | Age: 34
Discharge: HOME | End: 2024-04-29
Attending: PHYSICIAN ASSISTANT
Payer: COMMERCIAL

## 2024-04-29 DIAGNOSIS — G47.30 SLEEP-DISORDERED BREATHING: ICD-10-CM

## 2024-04-29 DIAGNOSIS — G47.33 OSA (OBSTRUCTIVE SLEEP APNEA): Primary | ICD-10-CM

## 2024-04-29 PROCEDURE — 95806 SLEEP STUDY UNATT&RESP EFFT: CPT | Performed by: INTERNAL MEDICINE

## 2024-04-29 PROCEDURE — 95806 SLEEP STUDY UNATT&RESP EFFT: CPT | Mod: 26,,, | Performed by: INTERNAL MEDICINE

## 2024-04-29 NOTE — PROCEDURES
"Based on the American academy Sleep Medicine practice parameter of CPAP would be the  guideline recommendation of choice.  Other therapies may include ENT procedures were appropriate, significant weight loss. Inspire  hypoglossal nerve stimulator ,mandibular advancement device  may also be considered.  Close follow up to ensure resolution of symptoms.  2 night study  SEVERE OBSTRUCTIVE SLEEP APNEA with overall AHI 58.5 /hr (339 events):  night # 2  Oxygen desaturation: < 70 %. SpO2 between 70% to 79% for 15 min.  Patient snored 99 % time above 50 .  Heart rate range: 60 bpm - 84bpm  REC's:  Therapy with APAP at 6-20 cm WP using mask of choice with heated humidification is an option.  Please refer to sleep disorders clinic  Weight loss/management. with regular exercise per direction of physician.  Avoid drowsy driving.  Follow up in sleep clinic to maximize adherence and ensure resolution of symptoms.  1. Optimize upper airway followed by evaluation for CPAP therapy.  2. Therapies aimed at reducing upper airway resistance during sleep may be of benefit, including  weight loss and sleep in a non-supine position. In addition, the avoidance of chemical agents that  promote  upper airway collapsibility such as alcohol, muscle relaxants, and certain sedatives may be  beneficial  3. Weight loss may offer benefit. Reduction of as little as 7 pounds can be significant.  4. Persons with excessive daytime sleepiness may be at an increased risk for motor vehicle and  occupation related  accidents.  5. Home sleep testing      Dear Selene Oseguera, PA-C  [id number] [place] [address] [place] [address]/Alma Russell, NATALYA         The sleep study that you ordered is complete.  You have ordered sleep LAB services to perform the sleep study for Enrike Parish .      Please find Sleep Study result in  the "Media tab" of Chart Review menu.        You can look  for the report in the  Media by the document type "Sleep " "Study Documents". Alphabetizing  "Document type" column helps to find the SLEEP STUDY report  Faster.       As the ordering provider, you are responsible for reviewing the results and implementing a treatment plan with your patient.    If you need a Sleep Medicine provider to explain the sleep study findings and arrange treatment for the patient, please refer patient for consultation to our Sleep Clinic via [facility] with Ambulatory Consult Sleep.     To do that please place an order for an  "Ambulatory Consult Sleep" -  order , it will go to our clinic work queue for our staff  to contact the patient for an appointment.      For any questions, please contact our sleep lab  staff at [phone] to talk to clinical staff          Isac Godinez MD   "

## 2024-04-30 ENCOUNTER — TELEPHONE (OUTPATIENT)
Dept: PULMONOLOGY | Facility: CLINIC | Age: 34
End: 2024-04-30
Payer: COMMERCIAL

## 2024-04-30 NOTE — TELEPHONE ENCOUNTER
Called patient. Spoke with patients wife to schedule appt. ----- Message from Jennifer Heaton LPN sent at 4/29/2024  3:52 PM CDT -----    ----- Message -----  From: James Michel  Sent: 4/29/2024   1:00 PM CDT  To: Jennifer Heaton LPN    Need f/u appt with Selene Oseguera.HSAT results.    Thanks

## 2024-05-02 ENCOUNTER — OFFICE VISIT (OUTPATIENT)
Dept: PULMONOLOGY | Facility: CLINIC | Age: 34
End: 2024-05-02
Payer: COMMERCIAL

## 2024-05-02 DIAGNOSIS — G47.39 OTHER SLEEP APNEA: ICD-10-CM

## 2024-05-02 DIAGNOSIS — I10 ESSENTIAL HYPERTENSION: ICD-10-CM

## 2024-05-02 DIAGNOSIS — E66.01 SEVERE OBESITY (BMI 35.0-39.9) WITH COMORBIDITY: Primary | ICD-10-CM

## 2024-05-02 PROCEDURE — 4010F ACE/ARB THERAPY RXD/TAKEN: CPT | Mod: CPTII,95,, | Performed by: PHYSICIAN ASSISTANT

## 2024-05-02 PROCEDURE — 1159F MED LIST DOCD IN RCRD: CPT | Mod: CPTII,95,, | Performed by: PHYSICIAN ASSISTANT

## 2024-05-02 PROCEDURE — 1160F RVW MEDS BY RX/DR IN RCRD: CPT | Mod: CPTII,95,, | Performed by: PHYSICIAN ASSISTANT

## 2024-05-02 PROCEDURE — 3008F BODY MASS INDEX DOCD: CPT | Mod: CPTII,95,, | Performed by: PHYSICIAN ASSISTANT

## 2024-05-02 PROCEDURE — 99213 OFFICE O/P EST LOW 20 MIN: CPT | Mod: 95,,, | Performed by: PHYSICIAN ASSISTANT

## 2024-05-03 VITALS — BODY MASS INDEX: 39.17 KG/M2 | WEIGHT: 315 LBS | HEIGHT: 75 IN

## 2024-05-03 NOTE — ASSESSMENT & PLAN NOTE
Severe sleep apnea  CPAP with Full Face CPAP Mask ordered  Discussed therapeutic goals for CPAP: Ideal usage 100% of nights for 6-8 hours per night. Minimum usage is 70% of night for at least 4 hours per night.

## 2024-05-03 NOTE — PROGRESS NOTES
Subjective:       Patient ID: Enrike Parish is a 34 y.o. male.    Chief Complaint: CODEY    The patient location is: home in [place]  The chief complaint leading to consultation is: CODEY    Visit type: audiovisual    Face to Face time with patient: 20 minutes  25 minutes of total time spent on the encounter, which includes face to face time and non-face to face time preparing to see the patient (eg, review of tests), Obtaining and/or reviewing separately obtained history, Documenting clinical information in the electronic or other health record, Independently interpreting results (not separately reported) and communicating results to the patient/family/caregiver, or Care coordination (not separately reported).         Each patient to whom he or she provides medical services by telemedicine is:  (1) informed of the relationship between the physician and patient and the respective role of any other health care provider with respect to management of the patient; and (2) notified that he or she may decline to receive medical services by telemedicine and may withdraw from such care at any time.    Notes:     5/3/24  Here for CODEY follow up  Sleep study review  HSAT revealed severe sleep apnea AHI 58.5/hr  He is ready to start CPAP  CPAP with Full Face CPAP Mask ordered 4/29 4/19/24  New patient here for sleep apnea  Snores, witnessed apnea, wakes up with dry mouth  No set sleep times; works shift work, [employer]   Gets 3-5 hours of sleep a night  Never feels rested  Has daytime fatigue  No sleep aids  No alcohol or caffeine  Wants CPAP if indicated    BP Readings from Last 3 Encounters:   03/28/24 (!) 146/90   09/05/23 138/88   08/31/23 (!) 148/82     Snoring / Sleep:     [place] Questionnaire (validated CODEY screening questionnaire)    yes -- Snoring/apnea    yes -- Fatigue    Body mass index is 42.66 kg/m².  (>25 is overweight, >30 is obese)    Blood Pressure = Hypertension  (PreHTN 120-139/80-89, Stg1  140-159/90-99, Stg2 >160/>100)  [place] = 3 of three CODEY categories are positive (high risk is 2-3 positive categories)     [place] Sleepiness Scale TOTAL =   15  (validated sleepiness questionnaire with a higher score indicating greater sleepiness; range 0-24)        STOP-Bang Questionnaire (validated CODEY screening questionnaire)  Negative unless checked off.  [x] Snoring    [x]  Tired/Fatigued/Sleepy  [x] Obstruction (apneas/choking)  [x] Pressure (HTN)  [x] BMI >35  [] Age >50  [x] Neck >40 cm  [x] Gender male   STOP-Bang = 7 (low risk 0-2,high risk 3-8)    Immunization History   Administered Date(s) Administered    DTP 1991, 1991, 1991, 08/05/1992    HIB 1991, 1991, 1991, 08/05/1992    MMR 08/05/1992    OPV 1991, 1991, 08/05/1992    Tdap 08/18/2020      Tobacco Use: High Risk (5/3/2024)    Patient History     Smoking Tobacco Use: Every Day     Smokeless Tobacco Use: Never     Passive Exposure: Not on file      No past medical history on file.   Current Outpatient Medications on File Prior to Visit   Medication Sig Dispense Refill    atorvastatin (LIPITOR) 20 MG tablet Take 1 tablet (20 mg total) by mouth once daily. 30 tablet 10    diethylpropion (TENUATE) 75 mg SR tablet Take 1 tablet (75 mg total) by mouth every morning. 30 tablet 0    HYDROcodone-acetaminophen (NORCO) 7.5-325 mg per tablet Take 1 tablet by mouth every 6 (six) hours as needed for Pain. (Patient not taking: Reported on 3/28/2024) 20 tablet 0    ibuprofen (ADVIL,MOTRIN) 800 MG tablet Take 1 tablet (800 mg total) by mouth every 8 (eight) hours as needed for Pain (or fever). Take with Food. (Patient not taking: Reported on 3/28/2024) 30 tablet 0    losartan (COZAAR) 25 MG tablet Take 1 tablet (25 mg total) by mouth once daily. 30 tablet 3     No current facility-administered medications on file prior to visit.        Review of Systems   Constitutional:  Negative for fever, weight loss, appetite change  "and weakness.   HENT:  Negative for postnasal drip, rhinorrhea, sinus pressure, trouble swallowing and congestion.    Respiratory:  Positive for apnea, snoring and somnolence. Negative for cough, sputum production, choking, chest tightness, shortness of breath, wheezing and dyspnea on extertion.    Cardiovascular:  Negative for chest pain and leg swelling.   Musculoskeletal:  Negative for joint swelling.   Gastrointestinal:  Negative for nausea, vomiting and abdominal pain.   Neurological:  Negative for dizziness, weakness and headaches.   All other systems reviewed and are negative.      Objective:       Vitals:    05/03/24 1016   Weight: (!) 154.8 kg (341 lb 4.4 oz)   Height: 6' 3" (1.905 m)         Physical Exam   Constitutional: He is oriented to person, place, and time. He appears well-developed and well-nourished. No distress. He is obese.   HENT:   Mouth/Throat: Oropharynx is clear and moist.   Pulmonary/Chest: Effort normal. No respiratory distress.   Musculoskeletal:         General: No edema.      Cervical back: Normal range of motion.   Neurological: He is alert and oriented to person, place, and time.   Skin: No rash noted.   Psychiatric: He has a normal mood and affect.   Vitals reviewed.    Personal Diagnostic Review          Procedure Note    Based on the American academy Sleep Medicine practice parameter of CPAP would be the  guideline recommendation of choice.  Other therapies may include ENT procedures were appropriate, significant weight loss. Inspire  hypoglossal nerve stimulator ,mandibular advancement device  may also be considered.  Close follow up to ensure resolution of symptoms.  2 night study  SEVERE OBSTRUCTIVE SLEEP APNEA with overall AHI 58.5 /hr (339 events):  night # 2  Oxygen desaturation: < 70 %. SpO2 between 70% to 79% for 15 min.  Patient snored 99 % time above 50 .  Heart rate range: 60 bpm - 84bpm  REC's:  Therapy with APAP at 6-20 cm WP using mask of choice with heated " humidification is an option.  Please refer to sleep disorders clinic  Weight loss/management. with regular exercise per direction of physician.  Avoid drowsy driving.  Follow up in sleep clinic to maximize adherence and ensure resolution of symptoms.  1. Optimize upper airway followed by evaluation for CPAP therapy.  2. Therapies aimed at reducing upper airway resistance during sleep may be of benefit, including  weight loss and sleep in a non-supine position. In addition, the avoidance of chemical agents that  promote  upper airway collapsibility such as alcohol, muscle relaxants, and certain sedatives may be  beneficial  3. Weight loss may offer benefit. Reduction of as little as 7 pounds can be significant.  4. Persons with excessive daytime sleepiness may be at an increased risk for motor vehicle and  occupation related  accidents.  5. Home sleep testing        Dear eSlene Oseguera PA-C  [address] [place] [address]/Alma Russell NP                 Assessment/Plan:           Problem List Items Addressed This Visit          Cardiac/Vascular    Essential hypertension     HTN medication as prescribed, F/u regularly with PCP              Endocrine    Severe obesity (BMI 35.0-39.9) with comorbidity - Primary     Weight loss and exercise to improve overall health.              Other    Other sleep apnea     Severe sleep apnea  CPAP with Full Face CPAP Mask ordered  Discussed therapeutic goals for CPAP: Ideal usage 100% of nights for 6-8 hours per night. Minimum usage is 70% of night for at least 4 hours per night.            Follow up in about 10 weeks (around 7/11/2024) for new cpap dl.    Discussed diagnosis, its evaluation, treatment and usual course. All questions answered.    Patient verbalized understanding of plan and left in no acute distress    Thank you for the courtesy of participating in the care of this patient    Selene Oseguera PA-C  [facility]

## 2024-05-31 DIAGNOSIS — E66.01 MORBID OBESITY: ICD-10-CM

## 2024-06-03 RX ORDER — DIETHYLPROPION HYDROCHLORIDE 75 MG/1
75 TABLET, EXTENDED RELEASE ORAL EVERY MORNING
Qty: 30 TABLET | Refills: 0 | Status: SHIPPED | OUTPATIENT
Start: 2024-06-03

## 2024-06-05 ENCOUNTER — PATIENT MESSAGE (OUTPATIENT)
Dept: FAMILY MEDICINE | Facility: CLINIC | Age: 34
End: 2024-06-05
Payer: COMMERCIAL

## 2024-06-05 DIAGNOSIS — I10 ESSENTIAL HYPERTENSION: ICD-10-CM

## 2024-06-05 RX ORDER — LOSARTAN POTASSIUM 25 MG/1
25 TABLET ORAL DAILY
Qty: 30 TABLET | Refills: 3 | Status: SHIPPED | OUTPATIENT
Start: 2024-06-05 | End: 2024-06-11 | Stop reason: SDUPTHER

## 2024-06-10 DIAGNOSIS — E78.5 HYPERLIPIDEMIA, UNSPECIFIED HYPERLIPIDEMIA TYPE: ICD-10-CM

## 2024-06-11 DIAGNOSIS — E78.5 HYPERLIPIDEMIA, UNSPECIFIED HYPERLIPIDEMIA TYPE: ICD-10-CM

## 2024-06-11 DIAGNOSIS — I10 ESSENTIAL HYPERTENSION: ICD-10-CM

## 2024-06-11 RX ORDER — ATORVASTATIN CALCIUM 20 MG/1
20 TABLET, FILM COATED ORAL DAILY
Qty: 30 TABLET | Refills: 10 | Status: SHIPPED | OUTPATIENT
Start: 2024-06-11 | End: 2025-06-11

## 2024-06-11 RX ORDER — ATORVASTATIN CALCIUM 20 MG/1
20 TABLET, FILM COATED ORAL DAILY
Qty: 30 TABLET | Refills: 10 | Status: SHIPPED | OUTPATIENT
Start: 2024-06-11 | End: 2024-06-11 | Stop reason: SDUPTHER

## 2024-06-11 RX ORDER — LOSARTAN POTASSIUM 25 MG/1
25 TABLET ORAL DAILY
Qty: 90 TABLET | Refills: 3 | Status: SHIPPED | OUTPATIENT
Start: 2024-06-11 | End: 2025-06-11

## 2024-07-03 DIAGNOSIS — E66.01 MORBID OBESITY: ICD-10-CM

## 2024-07-03 RX ORDER — DIETHYLPROPION HYDROCHLORIDE 75 MG/1
75 TABLET, EXTENDED RELEASE ORAL EVERY MORNING
Qty: 30 TABLET | Refills: 0 | Status: SHIPPED | OUTPATIENT
Start: 2024-07-03

## 2024-08-12 DIAGNOSIS — E66.01 MORBID OBESITY: ICD-10-CM

## 2024-08-12 RX ORDER — DIETHYLPROPION HYDROCHLORIDE 75 MG/1
75 TABLET, EXTENDED RELEASE ORAL EVERY MORNING
Qty: 30 TABLET | Refills: 0 | Status: SHIPPED | OUTPATIENT
Start: 2024-08-12

## 2025-05-01 ENCOUNTER — ON-DEMAND VIRTUAL (OUTPATIENT)
Dept: URGENT CARE | Facility: CLINIC | Age: 35
End: 2025-05-01
Payer: COMMERCIAL

## 2025-05-01 DIAGNOSIS — J01.90 ACUTE RHINOSINUSITIS: Primary | ICD-10-CM

## 2025-05-01 RX ORDER — BENZONATATE 200 MG/1
200 CAPSULE ORAL 3 TIMES DAILY PRN
Qty: 30 CAPSULE | Refills: 0 | Status: SHIPPED | OUTPATIENT
Start: 2025-05-01 | End: 2025-05-11

## 2025-05-01 RX ORDER — AZITHROMYCIN 250 MG/1
TABLET, FILM COATED ORAL
Qty: 6 TABLET | Refills: 0 | Status: SHIPPED | OUTPATIENT
Start: 2025-05-01 | End: 2025-05-06

## 2025-05-01 RX ORDER — FLUTICASONE PROPIONATE 50 MCG
1 SPRAY, SUSPENSION (ML) NASAL DAILY
Qty: 15.8 ML | Refills: 0 | Status: SHIPPED | OUTPATIENT
Start: 2025-05-01

## 2025-05-01 NOTE — PROGRESS NOTES
Subjective:      Patient ID: Enrike Parish is a 35 y.o. male.    Vitals:  vitals were not taken for this visit.     Chief Complaint: URI      Visit Type: TELE AUDIOVISUAL - This visit was conducted virtually based on  subjective information and limited objective exam    Present with the patient at the time of consultation: TELEMED PRESENT WITH PATIENT: None  LOCATION OF PATIENT [place], [place]  Two patient identifiers used to verify patient- saying out date of birth and full name.       No past medical history on file.  Past Surgical History:   Procedure Laterality Date    KNEE SURGERY Bilateral      Review of patient's allergies indicates:  No Known Allergies  Medications Ordered Prior to Encounter[1]  Family History   Problem Relation Name Age of Onset    Multiple sclerosis Mother Stefania     Diabetes Mother Stefania     Stroke Father Higinio     Diabetes Maternal Uncle Don        Medications Ordered                [address] [facility]   [address] [facility] [address]    Telephone: [phone]   Fax: [phone]   Hours: Not open 24 hours                         E-Prescribed (3 of 3)              azithromycin (Z-BOBBY) 250 MG tablet    Sig: Take 2 tablets by mouth on day 1; Take 1 tablet by mouth on days 2-5       Start: 25     Quantity: 6 tablet Refills: 0                         benzonatate (TESSALON) 200 MG capsule    Sig: Take 1 capsule (200 mg total) by mouth 3 (three) times daily as needed for Cough.       Start: 25     Quantity: 30 capsule Refills: 0                         fluticasone propionate (FLONASE) 50 mcg/actuation nasal spray    Si spray (50 mcg total) by Each Nostril route once daily.       Start: 25     Quantity: 15.8 mL Refills: 0                           Ohs Peq Odvv Intake    2025  4:13 PM CDT - Filed by Patient   What is your current physical address in the event of a medical emergency? [address]  [facility] [address]   Are you able to take your vital signs? No   Please attach any relevant images or files    Is your employer contracted with [facility]? No         36 yo male c/o  nasal congestion, sinus pressure, sore throat, coughing for the past 5 days.   Tried dayquil with no relief of sx.   Reports wife has similar sx and tx with abx.   Denies fever, chills, SOB, weakness, fatigue.         Constitution: Negative for activity change, appetite change, chills and fever.   HENT:  Positive for congestion, postnasal drip, sinus pain, sinus pressure and sore throat. Negative for ear pain, trouble swallowing and voice change.    Respiratory:  Positive for cough and sputum production. Negative for shortness of breath and wheezing.    Gastrointestinal:  Negative for nausea, vomiting, constipation and diarrhea.        Objective:   The physical exam was conducted virtually.    AAO x 3 ; no acute distress noted; appearance normal; mood and behavior normal; thought process normal  Head- normocephalic  Nose- nasal congestion, sinus pressure  Eyes- pupils appear normal in size, no drainage, no erythema  Ears- normal appearing; no discharge, no erythema  Mouth- appears normal  Oropharynx- no erythema, lesions  Lungs- breathing at a normal rate, no acute distress noted  Heart- no reports of tachycardia, palpitations, chest pain  Abdomen- non distended, non tender reported by patient  Skin- warm and dry, no erythema or edema noted by patient or visualized  Psych- as above; no si/hi      Assessment:     1. Acute rhinosinusitis        Plan:     PLEASE READ YOUR DISCHARGE INSTRUCTIONS ENTIRELY AS IT CONTAINS IMPORTANT INFORMATION.      Please drink plenty of fluids.    Please get plenty of rest.    Please return here or go to the Emergency Department for any concerns or worsening of condition.    Please take an over the counter antihistamine medication (allegra/Claritin/Zyrtec) of your choice as directed.    Try an  over the counter decongestant like Mucinex D or Sudafed. You buy this behind the pharmacy counter    If you do have Hypertension or palpitations, it is safe to take Coricidin HBP for relief of sinus symptoms.    If not allergic, please take over the counter Tylenol (Acetaminophen) and/or Motrin (Ibuprofen) as directed for control of pain and/or fever.  Please follow up with your primary care doctor or specialist as needed.    Sore throat recommendations: Warm fluids, warm salt water gargles, throat lozenges, tea, honey, soup, rest, hydration.    Use over the counter flonase: one spray each nostril twice daily OR two sprays each nostril once daily.     Sinus rinses DO NOT USE TAP WATER, if you must, water must be a rolling boil for 1 minute, let it cool, then use.  May use distilled water, or over the counter nasal saline rinses.  Vics vapor rub in shower to help open nasal passages.  May use nasal gel to keep passages moisturized.  May use Nasal saline sprays during the day for added relief of congestion.   For those who go to the gym, please do not use the sauna or steam room now to clear sinuses.    If you  smoke, please stop smoking.      Please return or see your primary care doctor if you develop new or worsening symptoms.     Please arrange follow up with your primary medical clinic as soon as possible. You must understand that you've received Virtual treatment only and that you may be released before all of your medical problems are known or treated. You, the patient, will arrange for follow up as instructed. If your symptoms worsen or fail to improve you should go to the Emergency Room.      Thank you for choosing [facility]!    Our goal in the [facility] is to always provide outstanding medical care. You may receive a survey by mail or e-mail in the next week regarding your experience today. We would greatly appreciate you completing and returning the survey. Your  feedback provides us with a way to recognize our staff who provide very good care, and it helps us learn how to improve when your experience was below our aspiration of excellence.         We appreciate you trusting us with your medical care. We hope you feel better soon. We will be happy to take care of you for all of your future medical needs.    You must understand that you've received an Urgent Care treatment only and that you may be released before all your medical problems are known or treated. You, the patient, will arrange for follow up care as instructed.    Follow up with your PCP or specialty clinic as directed in the next 1-2 weeks if not improved or as needed.  You can call [phone] to schedule an appointment with the appropriate provider.    If your condition worsens we recommend that you receive another evaluation in person, with your primary care provider, urgent care or at the emergency room immediately or contact your primary medical clinics after hours call service to discuss your concerns.         Acute rhinosinusitis  -     azithromycin (Z-BOBBY) 250 MG tablet; Take 2 tablets by mouth on day 1; Take 1 tablet by mouth on days 2-5  Dispense: 6 tablet; Refill: 0  -     fluticasone propionate (FLONASE) 50 mcg/actuation nasal spray; 1 spray (50 mcg total) by Each Nostril route once daily.  Dispense: 15.8 mL; Refill: 0  -     benzonatate (TESSALON) 200 MG capsule; Take 1 capsule (200 mg total) by mouth 3 (three) times daily as needed for Cough.  Dispense: 30 capsule; Refill: 0                         [1]   Current Outpatient Medications on File Prior to Visit   Medication Sig Dispense Refill    atorvastatin (LIPITOR) 20 MG tablet Take 1 tablet (20 mg total) by mouth once daily. 30 tablet 10    diethylpropion (TENUATE) 75 mg SR tablet Take 1 tablet (75 mg total) by mouth every morning. 30 tablet 0    HYDROcodone-acetaminophen (NORCO) 7.5-325 mg per tablet Take 1 tablet by mouth every 6 (six) hours  as needed for Pain. (Patient not taking: Reported on 3/28/2024) 20 tablet 0    ibuprofen (ADVIL,MOTRIN) 800 MG tablet Take 1 tablet (800 mg total) by mouth every 8 (eight) hours as needed for Pain (or fever). Take with Food. (Patient not taking: Reported on 3/28/2024) 30 tablet 0    losartan (COZAAR) 25 MG tablet Take 1 tablet (25 mg total) by mouth once daily. 90 tablet 3     No current facility-administered medications on file prior to visit.

## 2025-08-11 ENCOUNTER — PATIENT MESSAGE (OUTPATIENT)
Dept: INTERNAL MEDICINE | Facility: CLINIC | Age: 35
End: 2025-08-11